# Patient Record
Sex: FEMALE | Race: WHITE | NOT HISPANIC OR LATINO | URBAN - METROPOLITAN AREA
[De-identification: names, ages, dates, MRNs, and addresses within clinical notes are randomized per-mention and may not be internally consistent; named-entity substitution may affect disease eponyms.]

---

## 2017-01-02 ENCOUNTER — HOSPITAL ENCOUNTER (OUTPATIENT)
Dept: MRI IMAGING | Facility: HOSPITAL | Age: 82
Discharge: HOME | End: 2017-01-02
Attending: PHYSICIAN ASSISTANT

## 2017-01-02 DIAGNOSIS — G47.9 SLEEP DISTURBANCE: ICD-10-CM

## 2017-01-02 DIAGNOSIS — M79.18 MYOFASCIAL PAIN: ICD-10-CM

## 2017-01-02 DIAGNOSIS — M54.50 LUMBALGIA: ICD-10-CM

## 2017-01-02 DIAGNOSIS — M53.3 SACROILIAC DYSFUNCTION: ICD-10-CM

## 2017-01-02 DIAGNOSIS — M54.16 LUMBAR RADICULITIS: ICD-10-CM

## 2017-01-05 ENCOUNTER — AMBULATORY - HEALTHEAST (OUTPATIENT)
Dept: NURSING | Facility: CLINIC | Age: 82
End: 2017-01-05

## 2017-01-05 ENCOUNTER — HOSPITAL ENCOUNTER (OUTPATIENT)
Dept: PHYSICAL MEDICINE AND REHAB | Facility: CLINIC | Age: 82
Discharge: HOME | End: 2017-01-05
Attending: PHYSICIAN ASSISTANT

## 2017-01-05 DIAGNOSIS — M51.26 LUMBAR DISC HERNIATION: ICD-10-CM

## 2017-01-05 DIAGNOSIS — G47.9 SLEEP DISTURBANCE: ICD-10-CM

## 2017-01-05 DIAGNOSIS — M79.18 MYOFASCIAL PAIN: ICD-10-CM

## 2017-01-05 DIAGNOSIS — M54.50 LUMBALGIA: ICD-10-CM

## 2017-01-05 DIAGNOSIS — M54.16 LUMBAR RADICULITIS: ICD-10-CM

## 2017-01-05 DIAGNOSIS — M53.3 SACROILIAC DYSFUNCTION: ICD-10-CM

## 2017-01-05 DIAGNOSIS — Z79.01 ON WARFARIN THERAPY: ICD-10-CM

## 2017-01-05 DIAGNOSIS — I48.20 CHRONIC ATRIAL FIBRILLATION (H): ICD-10-CM

## 2017-01-12 ENCOUNTER — COMMUNICATION - HEALTHEAST (OUTPATIENT)
Dept: PHYSICAL MEDICINE AND REHAB | Facility: CLINIC | Age: 82
End: 2017-01-12

## 2017-01-16 ENCOUNTER — AMBULATORY - HEALTHEAST (OUTPATIENT)
Dept: LAB | Facility: CLINIC | Age: 82
End: 2017-01-16

## 2017-01-16 ENCOUNTER — COMMUNICATION - HEALTHEAST (OUTPATIENT)
Dept: NURSING | Facility: CLINIC | Age: 82
End: 2017-01-16

## 2017-01-16 DIAGNOSIS — I48.91 ATRIAL FIBRILLATION (H): ICD-10-CM

## 2017-01-30 ENCOUNTER — HOSPITAL ENCOUNTER (OUTPATIENT)
Dept: PHYSICAL MEDICINE AND REHAB | Facility: CLINIC | Age: 82
Discharge: HOME | End: 2017-01-30
Attending: PHYSICIAN ASSISTANT

## 2017-01-30 DIAGNOSIS — M54.50 LOW BACK PAIN: ICD-10-CM

## 2017-01-30 DIAGNOSIS — M54.16 LUMBAR RADICULAR PAIN: ICD-10-CM

## 2017-01-30 DIAGNOSIS — Z79.01 ON WARFARIN THERAPY: ICD-10-CM

## 2017-02-07 ENCOUNTER — AMBULATORY - HEALTHEAST (OUTPATIENT)
Dept: LAB | Facility: CLINIC | Age: 82
End: 2017-02-07

## 2017-02-07 ENCOUNTER — COMMUNICATION - HEALTHEAST (OUTPATIENT)
Dept: FAMILY MEDICINE | Facility: CLINIC | Age: 82
End: 2017-02-07

## 2017-02-07 ENCOUNTER — COMMUNICATION - HEALTHEAST (OUTPATIENT)
Dept: INTERNAL MEDICINE | Facility: CLINIC | Age: 82
End: 2017-02-07

## 2017-02-07 ENCOUNTER — OFFICE VISIT - HEALTHEAST (OUTPATIENT)
Dept: PODIATRY | Age: 82
End: 2017-02-07

## 2017-02-07 DIAGNOSIS — I48.91 ATRIAL FIBRILLATION (H): ICD-10-CM

## 2017-02-07 DIAGNOSIS — I48.91 A-FIB (H): ICD-10-CM

## 2017-02-07 DIAGNOSIS — M79.673 PAIN OF FOOT, UNSPECIFIED LATERALITY: ICD-10-CM

## 2017-02-07 DIAGNOSIS — L60.2 ONYCHAUXIS: ICD-10-CM

## 2017-02-22 ENCOUNTER — COMMUNICATION - HEALTHEAST (OUTPATIENT)
Dept: FAMILY MEDICINE | Facility: CLINIC | Age: 82
End: 2017-02-22

## 2017-02-22 DIAGNOSIS — I48.91 A-FIB (H): ICD-10-CM

## 2017-03-02 ENCOUNTER — HOSPITAL ENCOUNTER (OUTPATIENT)
Dept: PHYSICAL MEDICINE AND REHAB | Facility: CLINIC | Age: 82
Discharge: HOME | End: 2017-03-02
Attending: PHYSICIAN ASSISTANT

## 2017-03-02 DIAGNOSIS — M54.16 LUMBAR RADICULAR PAIN: ICD-10-CM

## 2017-03-02 DIAGNOSIS — Z79.01 ON WARFARIN THERAPY: ICD-10-CM

## 2017-03-02 DIAGNOSIS — M79.18 MYOFASCIAL PAIN: ICD-10-CM

## 2017-03-02 DIAGNOSIS — M54.16 LUMBAR RADICULITIS: ICD-10-CM

## 2017-03-02 DIAGNOSIS — M54.50 LUMBALGIA: ICD-10-CM

## 2017-03-02 DIAGNOSIS — M51.26 LUMBAR DISC HERNIATION: ICD-10-CM

## 2017-03-02 DIAGNOSIS — M54.50 LOW BACK PAIN: ICD-10-CM

## 2017-03-02 ASSESSMENT — MIFFLIN-ST. JEOR: SCORE: 787.36

## 2017-03-03 ENCOUNTER — COMMUNICATION - HEALTHEAST (OUTPATIENT)
Dept: FAMILY MEDICINE | Facility: CLINIC | Age: 82
End: 2017-03-03

## 2017-03-03 DIAGNOSIS — I10 UNSPECIFIED ESSENTIAL HYPERTENSION: ICD-10-CM

## 2017-03-07 ENCOUNTER — COMMUNICATION - HEALTHEAST (OUTPATIENT)
Dept: NURSING | Facility: CLINIC | Age: 82
End: 2017-03-07

## 2017-03-07 ENCOUNTER — AMBULATORY - HEALTHEAST (OUTPATIENT)
Dept: LAB | Facility: CLINIC | Age: 82
End: 2017-03-07

## 2017-03-07 DIAGNOSIS — I48.91 ATRIAL FIBRILLATION (H): ICD-10-CM

## 2017-03-10 ENCOUNTER — OFFICE VISIT - HEALTHEAST (OUTPATIENT)
Dept: CARDIOLOGY | Facility: CLINIC | Age: 82
End: 2017-03-10

## 2017-03-10 DIAGNOSIS — I50.32 CHRONIC DIASTOLIC CONGESTIVE HEART FAILURE (H): ICD-10-CM

## 2017-03-10 ASSESSMENT — MIFFLIN-ST. JEOR: SCORE: 780.1

## 2017-03-13 ENCOUNTER — AMBULATORY - HEALTHEAST (OUTPATIENT)
Dept: CARDIOLOGY | Facility: CLINIC | Age: 82
End: 2017-03-13

## 2017-03-13 DIAGNOSIS — I50.32 CHRONIC DIASTOLIC CONGESTIVE HEART FAILURE (H): ICD-10-CM

## 2017-03-13 DIAGNOSIS — I48.91 ATRIAL FIBRILLATION (H): ICD-10-CM

## 2017-03-16 ENCOUNTER — HOSPITAL ENCOUNTER (OUTPATIENT)
Dept: CARDIOLOGY | Facility: HOSPITAL | Age: 82
Discharge: HOME | End: 2017-03-16
Attending: INTERNAL MEDICINE

## 2017-03-16 DIAGNOSIS — I48.91 ATRIAL FIBRILLATION (H): ICD-10-CM

## 2017-03-28 ENCOUNTER — AMBULATORY - HEALTHEAST (OUTPATIENT)
Dept: CARDIOLOGY | Facility: CLINIC | Age: 82
End: 2017-03-28

## 2017-03-28 DIAGNOSIS — I50.32 CHRONIC DIASTOLIC CONGESTIVE HEART FAILURE (H): ICD-10-CM

## 2017-04-06 ENCOUNTER — AMBULATORY - HEALTHEAST (OUTPATIENT)
Dept: CARDIOLOGY | Facility: CLINIC | Age: 82
End: 2017-04-06

## 2017-04-06 DIAGNOSIS — R79.89 HIGH SERUM CREATININE: ICD-10-CM

## 2017-04-13 ENCOUNTER — AMBULATORY - HEALTHEAST (OUTPATIENT)
Dept: CARDIOLOGY | Facility: CLINIC | Age: 82
End: 2017-04-13

## 2017-04-13 DIAGNOSIS — R79.89 HIGH SERUM CREATININE: ICD-10-CM

## 2017-04-18 ENCOUNTER — AMBULATORY - HEALTHEAST (OUTPATIENT)
Dept: LAB | Facility: CLINIC | Age: 82
End: 2017-04-18

## 2017-04-18 ENCOUNTER — COMMUNICATION - HEALTHEAST (OUTPATIENT)
Dept: INTERNAL MEDICINE | Facility: CLINIC | Age: 82
End: 2017-04-18

## 2017-04-18 DIAGNOSIS — I48.91 ATRIAL FIBRILLATION (H): ICD-10-CM

## 2017-04-18 DIAGNOSIS — I48.91 A-FIB (H): ICD-10-CM

## 2017-04-20 ENCOUNTER — OFFICE VISIT - HEALTHEAST (OUTPATIENT)
Dept: CARDIOLOGY | Facility: CLINIC | Age: 82
End: 2017-04-20

## 2017-04-20 DIAGNOSIS — I48.21 PERMANENT ATRIAL FIBRILLATION (H): ICD-10-CM

## 2017-04-20 ASSESSMENT — MIFFLIN-ST. JEOR: SCORE: 761.95

## 2017-04-28 ENCOUNTER — AMBULATORY - HEALTHEAST (OUTPATIENT)
Dept: LAB | Facility: CLINIC | Age: 82
End: 2017-04-28

## 2017-04-28 ENCOUNTER — COMMUNICATION - HEALTHEAST (OUTPATIENT)
Dept: NURSING | Facility: CLINIC | Age: 82
End: 2017-04-28

## 2017-04-28 DIAGNOSIS — I48.91 A-FIB (H): ICD-10-CM

## 2017-04-28 DIAGNOSIS — I48.91 ATRIAL FIBRILLATION (H): ICD-10-CM

## 2017-05-03 ENCOUNTER — COMMUNICATION - HEALTHEAST (OUTPATIENT)
Dept: CARDIOLOGY | Facility: CLINIC | Age: 82
End: 2017-05-03

## 2017-05-03 ENCOUNTER — COMMUNICATION - HEALTHEAST (OUTPATIENT)
Dept: FAMILY MEDICINE | Facility: CLINIC | Age: 82
End: 2017-05-03

## 2017-05-03 DIAGNOSIS — I48.91 A-FIB (H): ICD-10-CM

## 2017-05-09 ENCOUNTER — COMMUNICATION - HEALTHEAST (OUTPATIENT)
Dept: NURSING | Facility: CLINIC | Age: 82
End: 2017-05-09

## 2017-05-09 ENCOUNTER — AMBULATORY - HEALTHEAST (OUTPATIENT)
Dept: LAB | Facility: CLINIC | Age: 82
End: 2017-05-09

## 2017-05-09 DIAGNOSIS — I48.91 ATRIAL FIBRILLATION (H): ICD-10-CM

## 2017-05-13 ENCOUNTER — COMMUNICATION - HEALTHEAST (OUTPATIENT)
Dept: CARDIOLOGY | Facility: CLINIC | Age: 82
End: 2017-05-13

## 2017-05-13 DIAGNOSIS — I50.9 CONGESTIVE HEART FAILURE (H): ICD-10-CM

## 2017-05-17 ENCOUNTER — COMMUNICATION - HEALTHEAST (OUTPATIENT)
Dept: FAMILY MEDICINE | Facility: CLINIC | Age: 82
End: 2017-05-17

## 2017-05-17 DIAGNOSIS — I48.21 PERMANENT ATRIAL FIBRILLATION (H): ICD-10-CM

## 2017-05-18 ENCOUNTER — OFFICE VISIT - HEALTHEAST (OUTPATIENT)
Dept: INTERNAL MEDICINE | Facility: CLINIC | Age: 82
End: 2017-05-18

## 2017-05-18 ENCOUNTER — COMMUNICATION - HEALTHEAST (OUTPATIENT)
Dept: FAMILY MEDICINE | Facility: CLINIC | Age: 82
End: 2017-05-18

## 2017-05-18 DIAGNOSIS — M81.0 OSTEOPOROSIS: ICD-10-CM

## 2017-05-18 DIAGNOSIS — I48.91 ATRIAL FIBRILLATION (H): ICD-10-CM

## 2017-05-18 DIAGNOSIS — I48.91 A-FIB (H): ICD-10-CM

## 2017-05-22 ENCOUNTER — HOSPITAL ENCOUNTER (OUTPATIENT)
Dept: CARDIOLOGY | Facility: HOSPITAL | Age: 82
Discharge: HOME | End: 2017-05-22
Attending: INTERNAL MEDICINE

## 2017-05-22 DIAGNOSIS — I48.21 PERMANENT ATRIAL FIBRILLATION (H): ICD-10-CM

## 2017-05-30 ENCOUNTER — AMBULATORY - HEALTHEAST (OUTPATIENT)
Dept: CARDIOLOGY | Facility: CLINIC | Age: 82
End: 2017-05-30

## 2017-05-30 DIAGNOSIS — I48.91 A-FIB (H): ICD-10-CM

## 2017-05-30 DIAGNOSIS — I48.21 PERMANENT ATRIAL FIBRILLATION (H): ICD-10-CM

## 2017-05-31 ENCOUNTER — COMMUNICATION - HEALTHEAST (OUTPATIENT)
Dept: CARDIOLOGY | Facility: CLINIC | Age: 82
End: 2017-05-31

## 2017-05-31 DIAGNOSIS — I50.9 CONGESTIVE HEART FAILURE (H): ICD-10-CM

## 2017-06-05 ENCOUNTER — COMMUNICATION - HEALTHEAST (OUTPATIENT)
Dept: NURSING | Facility: CLINIC | Age: 82
End: 2017-06-05

## 2017-06-05 ENCOUNTER — AMBULATORY - HEALTHEAST (OUTPATIENT)
Dept: LAB | Facility: CLINIC | Age: 82
End: 2017-06-05

## 2017-06-05 DIAGNOSIS — I48.91 ATRIAL FIBRILLATION (H): ICD-10-CM

## 2017-06-05 DIAGNOSIS — I48.91 A-FIB (H): ICD-10-CM

## 2017-06-19 ENCOUNTER — AMBULATORY - HEALTHEAST (OUTPATIENT)
Dept: LAB | Facility: CLINIC | Age: 82
End: 2017-06-19

## 2017-06-19 ENCOUNTER — COMMUNICATION - HEALTHEAST (OUTPATIENT)
Dept: NURSING | Facility: CLINIC | Age: 82
End: 2017-06-19

## 2017-06-19 DIAGNOSIS — I48.91 ATRIAL FIBRILLATION (H): ICD-10-CM

## 2017-07-18 ENCOUNTER — COMMUNICATION - HEALTHEAST (OUTPATIENT)
Dept: NURSING | Facility: CLINIC | Age: 82
End: 2017-07-18

## 2017-07-18 ENCOUNTER — COMMUNICATION - HEALTHEAST (OUTPATIENT)
Dept: SCHEDULING | Facility: CLINIC | Age: 82
End: 2017-07-18

## 2017-07-18 ENCOUNTER — AMBULATORY - HEALTHEAST (OUTPATIENT)
Dept: LAB | Facility: CLINIC | Age: 82
End: 2017-07-18

## 2017-07-18 ENCOUNTER — AMBULATORY - HEALTHEAST (OUTPATIENT)
Dept: PODIATRY | Age: 82
End: 2017-07-18

## 2017-07-18 DIAGNOSIS — L60.2 ONYCHAUXIS: ICD-10-CM

## 2017-07-18 DIAGNOSIS — I48.91 A-FIB (H): ICD-10-CM

## 2017-07-18 DIAGNOSIS — I48.91 ATRIAL FIBRILLATION (H): ICD-10-CM

## 2017-07-18 DIAGNOSIS — M79.673 PAIN OF FOOT, UNSPECIFIED LATERALITY: ICD-10-CM

## 2017-07-18 ASSESSMENT — MIFFLIN-ST. JEOR: SCORE: 752.88

## 2017-08-01 ENCOUNTER — COMMUNICATION - HEALTHEAST (OUTPATIENT)
Dept: NURSING | Facility: CLINIC | Age: 82
End: 2017-08-01

## 2017-08-01 DIAGNOSIS — I48.91 A-FIB (H): ICD-10-CM

## 2017-08-24 ENCOUNTER — COMMUNICATION - HEALTHEAST (OUTPATIENT)
Dept: FAMILY MEDICINE | Facility: CLINIC | Age: 82
End: 2017-08-24

## 2017-08-24 DIAGNOSIS — I48.91 A-FIB (H): ICD-10-CM

## 2017-08-28 ENCOUNTER — HOSPITAL ENCOUNTER (OUTPATIENT)
Dept: CARDIOLOGY | Facility: HOSPITAL | Age: 82
Discharge: HOME | End: 2017-08-28
Attending: INTERNAL MEDICINE

## 2017-08-28 DIAGNOSIS — I48.91 A-FIB (H): ICD-10-CM

## 2017-08-29 ENCOUNTER — COMMUNICATION - HEALTHEAST (OUTPATIENT)
Dept: NURSING | Facility: CLINIC | Age: 82
End: 2017-08-29

## 2017-08-29 ENCOUNTER — AMBULATORY - HEALTHEAST (OUTPATIENT)
Dept: LAB | Facility: CLINIC | Age: 82
End: 2017-08-29

## 2017-08-29 DIAGNOSIS — I48.91 A-FIB (H): ICD-10-CM

## 2017-09-12 ENCOUNTER — OFFICE VISIT - HEALTHEAST (OUTPATIENT)
Dept: FAMILY MEDICINE | Facility: CLINIC | Age: 82
End: 2017-09-12

## 2017-09-12 DIAGNOSIS — R23.8 DRY SCALP: ICD-10-CM

## 2017-10-02 ENCOUNTER — AMBULATORY - HEALTHEAST (OUTPATIENT)
Dept: LAB | Facility: CLINIC | Age: 82
End: 2017-10-02

## 2017-10-02 ENCOUNTER — AMBULATORY - HEALTHEAST (OUTPATIENT)
Dept: NURSING | Facility: CLINIC | Age: 82
End: 2017-10-02

## 2017-10-02 ENCOUNTER — COMMUNICATION - HEALTHEAST (OUTPATIENT)
Dept: NURSING | Facility: CLINIC | Age: 82
End: 2017-10-02

## 2017-10-02 DIAGNOSIS — Z23 FLU VACCINE NEED: ICD-10-CM

## 2017-10-02 DIAGNOSIS — I48.91 A-FIB (H): ICD-10-CM

## 2017-10-31 ENCOUNTER — AMBULATORY - HEALTHEAST (OUTPATIENT)
Dept: PODIATRY | Age: 82
End: 2017-10-31

## 2017-10-31 DIAGNOSIS — L60.2 ONYCHAUXIS: ICD-10-CM

## 2017-10-31 DIAGNOSIS — M79.673 PAIN OF FOOT, UNSPECIFIED LATERALITY: ICD-10-CM

## 2017-10-31 ASSESSMENT — MIFFLIN-ST. JEOR: SCORE: 757.42

## 2017-11-06 ENCOUNTER — COMMUNICATION - HEALTHEAST (OUTPATIENT)
Dept: NURSING | Facility: CLINIC | Age: 82
End: 2017-11-06

## 2017-11-06 ENCOUNTER — AMBULATORY - HEALTHEAST (OUTPATIENT)
Dept: LAB | Facility: CLINIC | Age: 82
End: 2017-11-06

## 2017-11-06 DIAGNOSIS — I48.91 A-FIB (H): ICD-10-CM

## 2017-11-20 ENCOUNTER — AMBULATORY - HEALTHEAST (OUTPATIENT)
Dept: NURSING | Facility: CLINIC | Age: 82
End: 2017-11-20

## 2017-11-20 DIAGNOSIS — M81.0 OSTEOPOROSIS: ICD-10-CM

## 2017-12-01 ENCOUNTER — OFFICE VISIT - HEALTHEAST (OUTPATIENT)
Dept: CARDIOLOGY | Facility: CLINIC | Age: 82
End: 2017-12-01

## 2017-12-01 DIAGNOSIS — I48.21 PERMANENT ATRIAL FIBRILLATION (H): ICD-10-CM

## 2017-12-01 ASSESSMENT — MIFFLIN-ST. JEOR: SCORE: 761.95

## 2017-12-08 ENCOUNTER — COMMUNICATION - HEALTHEAST (OUTPATIENT)
Dept: FAMILY MEDICINE | Facility: CLINIC | Age: 82
End: 2017-12-08

## 2017-12-08 DIAGNOSIS — I48.21 PERMANENT ATRIAL FIBRILLATION (H): ICD-10-CM

## 2017-12-11 ENCOUNTER — AMBULATORY - HEALTHEAST (OUTPATIENT)
Dept: LAB | Facility: CLINIC | Age: 82
End: 2017-12-11

## 2017-12-11 ENCOUNTER — COMMUNICATION - HEALTHEAST (OUTPATIENT)
Dept: NURSING | Facility: CLINIC | Age: 82
End: 2017-12-11

## 2017-12-11 DIAGNOSIS — I48.91 A-FIB (H): ICD-10-CM

## 2017-12-18 ENCOUNTER — COMMUNICATION - HEALTHEAST (OUTPATIENT)
Dept: FAMILY MEDICINE | Facility: CLINIC | Age: 82
End: 2017-12-18

## 2017-12-18 DIAGNOSIS — I10 ESSENTIAL HYPERTENSION: ICD-10-CM

## 2018-01-16 ENCOUNTER — COMMUNICATION - HEALTHEAST (OUTPATIENT)
Dept: NURSING | Facility: CLINIC | Age: 83
End: 2018-01-16

## 2018-01-16 ENCOUNTER — AMBULATORY - HEALTHEAST (OUTPATIENT)
Dept: PODIATRY | Age: 83
End: 2018-01-16

## 2018-01-16 ENCOUNTER — COMMUNICATION - HEALTHEAST (OUTPATIENT)
Dept: FAMILY MEDICINE | Facility: CLINIC | Age: 83
End: 2018-01-16

## 2018-01-16 ENCOUNTER — AMBULATORY - HEALTHEAST (OUTPATIENT)
Dept: LAB | Facility: CLINIC | Age: 83
End: 2018-01-16

## 2018-01-16 DIAGNOSIS — L60.2 ONYCHAUXIS: ICD-10-CM

## 2018-01-16 DIAGNOSIS — M79.673 PAIN OF FOOT, UNSPECIFIED LATERALITY: ICD-10-CM

## 2018-01-16 DIAGNOSIS — I48.91 A-FIB (H): ICD-10-CM

## 2018-01-16 LAB — INR PPP: 2.4 (ref 0.9–1.1)

## 2018-01-16 ASSESSMENT — MIFFLIN-ST. JEOR: SCORE: 761.95

## 2018-02-17 ENCOUNTER — COMMUNICATION - HEALTHEAST (OUTPATIENT)
Dept: CARDIOLOGY | Facility: CLINIC | Age: 83
End: 2018-02-17

## 2018-02-17 DIAGNOSIS — I50.9 CONGESTIVE HEART FAILURE (H): ICD-10-CM

## 2018-02-19 ENCOUNTER — COMMUNICATION - HEALTHEAST (OUTPATIENT)
Dept: NURSING | Facility: CLINIC | Age: 83
End: 2018-02-19

## 2018-02-19 DIAGNOSIS — I48.91 A-FIB (H): ICD-10-CM

## 2018-02-22 ENCOUNTER — COMMUNICATION - HEALTHEAST (OUTPATIENT)
Dept: CARDIOLOGY | Facility: CLINIC | Age: 83
End: 2018-02-22

## 2018-02-22 ENCOUNTER — COMMUNICATION - HEALTHEAST (OUTPATIENT)
Dept: NURSING | Facility: CLINIC | Age: 83
End: 2018-02-22

## 2018-02-22 ENCOUNTER — AMBULATORY - HEALTHEAST (OUTPATIENT)
Dept: LAB | Facility: CLINIC | Age: 83
End: 2018-02-22

## 2018-02-22 ENCOUNTER — COMMUNICATION - HEALTHEAST (OUTPATIENT)
Dept: FAMILY MEDICINE | Facility: CLINIC | Age: 83
End: 2018-02-22

## 2018-02-22 DIAGNOSIS — I48.91 A-FIB (H): ICD-10-CM

## 2018-02-22 DIAGNOSIS — I50.9 CONGESTIVE HEART FAILURE (H): ICD-10-CM

## 2018-02-22 LAB — INR PPP: 1.8 (ref 0.9–1.1)

## 2018-02-28 ENCOUNTER — OFFICE VISIT - HEALTHEAST (OUTPATIENT)
Dept: FAMILY MEDICINE | Facility: CLINIC | Age: 83
End: 2018-02-28

## 2018-02-28 ENCOUNTER — COMMUNICATION - HEALTHEAST (OUTPATIENT)
Dept: NURSING | Facility: CLINIC | Age: 83
End: 2018-02-28

## 2018-02-28 DIAGNOSIS — Z00.00 ROUTINE GENERAL MEDICAL EXAMINATION AT A HEALTH CARE FACILITY: ICD-10-CM

## 2018-02-28 DIAGNOSIS — Z87.440 H/O CYSTITIS: ICD-10-CM

## 2018-02-28 DIAGNOSIS — N18.4 CHRONIC KIDNEY DISEASE, STAGE IV (SEVERE) (H): ICD-10-CM

## 2018-02-28 DIAGNOSIS — G25.81 RESTLESS LEGS SYNDROME (RLS): ICD-10-CM

## 2018-02-28 DIAGNOSIS — I48.21 PERMANENT ATRIAL FIBRILLATION (H): ICD-10-CM

## 2018-02-28 DIAGNOSIS — I48.91 A-FIB (H): ICD-10-CM

## 2018-02-28 DIAGNOSIS — N18.4 STAGE 4 CHRONIC KIDNEY DISEASE (H): ICD-10-CM

## 2018-02-28 DIAGNOSIS — Z23 NEED FOR PNEUMOCOCCAL VACCINATION: ICD-10-CM

## 2018-02-28 DIAGNOSIS — I10 ESSENTIAL HYPERTENSION: ICD-10-CM

## 2018-02-28 LAB
ALBUMIN UR-MCNC: NEGATIVE MG/DL
APPEARANCE UR: CLEAR
BACTERIA #/AREA URNS HPF: ABNORMAL HPF
BILIRUB UR QL STRIP: NEGATIVE
COLOR UR AUTO: YELLOW
FERRITIN SERPL-MCNC: 76 NG/ML (ref 10–130)
GLUCOSE UR STRIP-MCNC: NEGATIVE MG/DL
HGB UR QL STRIP: NEGATIVE
HYALINE CASTS #/AREA URNS LPF: ABNORMAL LPF
INR PPP: 1.9 (ref 0.9–1.1)
IRON SATN MFR SERPL: 29 % (ref 20–50)
IRON SERPL-MCNC: 97 UG/DL (ref 42–175)
KETONES UR STRIP-MCNC: NEGATIVE MG/DL
LEUKOCYTE ESTERASE UR QL STRIP: ABNORMAL
NITRATE UR QL: NEGATIVE
PH UR STRIP: 5.5 [PH] (ref 5–8)
RBC #/AREA URNS AUTO: ABNORMAL HPF
SP GR UR STRIP: 1.01 (ref 1–1.03)
SQUAMOUS #/AREA URNS AUTO: ABNORMAL LPF
TIBC SERPL-MCNC: 330 UG/DL (ref 313–563)
TRANSFERRIN SERPL-MCNC: 264 MG/DL (ref 212–360)
UROBILINOGEN UR STRIP-ACNC: ABNORMAL
WBC #/AREA URNS AUTO: ABNORMAL HPF

## 2018-02-28 ASSESSMENT — MIFFLIN-ST. JEOR: SCORE: 750.62

## 2018-03-01 LAB — BACTERIA SPEC CULT: NO GROWTH

## 2018-03-02 ENCOUNTER — COMMUNICATION - HEALTHEAST (OUTPATIENT)
Dept: FAMILY MEDICINE | Facility: CLINIC | Age: 83
End: 2018-03-02

## 2018-03-13 ENCOUNTER — COMMUNICATION - HEALTHEAST (OUTPATIENT)
Dept: NURSING | Facility: CLINIC | Age: 83
End: 2018-03-13

## 2018-03-13 ENCOUNTER — AMBULATORY - HEALTHEAST (OUTPATIENT)
Dept: LAB | Facility: CLINIC | Age: 83
End: 2018-03-13

## 2018-03-13 DIAGNOSIS — I48.91 A-FIB (H): ICD-10-CM

## 2018-03-13 LAB — INR PPP: 1.7 (ref 0.9–1.1)

## 2018-03-27 ENCOUNTER — AMBULATORY - HEALTHEAST (OUTPATIENT)
Dept: PODIATRY | Age: 83
End: 2018-03-27

## 2018-03-27 ENCOUNTER — COMMUNICATION - HEALTHEAST (OUTPATIENT)
Dept: NURSING | Facility: CLINIC | Age: 83
End: 2018-03-27

## 2018-03-27 ENCOUNTER — AMBULATORY - HEALTHEAST (OUTPATIENT)
Dept: LAB | Facility: CLINIC | Age: 83
End: 2018-03-27

## 2018-03-27 DIAGNOSIS — M79.673 PAIN OF FOOT, UNSPECIFIED LATERALITY: ICD-10-CM

## 2018-03-27 DIAGNOSIS — I48.91 A-FIB (H): ICD-10-CM

## 2018-03-27 DIAGNOSIS — L60.2 ONYCHAUXIS: ICD-10-CM

## 2018-03-27 LAB — INR PPP: 1.6 (ref 0.9–1.1)

## 2018-03-27 ASSESSMENT — MIFFLIN-ST. JEOR: SCORE: 750.62

## 2018-04-04 ENCOUNTER — COMMUNICATION - HEALTHEAST (OUTPATIENT)
Dept: ADMINISTRATIVE | Facility: CLINIC | Age: 83
End: 2018-04-04

## 2018-04-10 ENCOUNTER — AMBULATORY - HEALTHEAST (OUTPATIENT)
Dept: LAB | Facility: CLINIC | Age: 83
End: 2018-04-10

## 2018-04-10 ENCOUNTER — COMMUNICATION - HEALTHEAST (OUTPATIENT)
Dept: ANTICOAGULATION | Facility: CLINIC | Age: 83
End: 2018-04-10

## 2018-04-10 DIAGNOSIS — I48.91 A-FIB (H): ICD-10-CM

## 2018-04-10 LAB — INR PPP: 1.7 (ref 0.9–1.1)

## 2018-04-20 ENCOUNTER — COMMUNICATION - HEALTHEAST (OUTPATIENT)
Dept: ANTICOAGULATION | Facility: CLINIC | Age: 83
End: 2018-04-20

## 2018-04-20 ENCOUNTER — AMBULATORY - HEALTHEAST (OUTPATIENT)
Dept: LAB | Facility: CLINIC | Age: 83
End: 2018-04-20

## 2018-04-20 DIAGNOSIS — I48.91 A-FIB (H): ICD-10-CM

## 2018-04-20 LAB — INR PPP: 3 (ref 0.9–1.1)

## 2018-05-04 ENCOUNTER — COMMUNICATION - HEALTHEAST (OUTPATIENT)
Dept: ANTICOAGULATION | Facility: CLINIC | Age: 83
End: 2018-05-04

## 2018-05-04 ENCOUNTER — AMBULATORY - HEALTHEAST (OUTPATIENT)
Dept: LAB | Facility: CLINIC | Age: 83
End: 2018-05-04

## 2018-05-04 DIAGNOSIS — I48.91 A-FIB (H): ICD-10-CM

## 2018-05-04 LAB — INR PPP: 3.1 (ref 0.9–1.1)

## 2018-05-21 ENCOUNTER — COMMUNICATION - HEALTHEAST (OUTPATIENT)
Dept: ANTICOAGULATION | Facility: CLINIC | Age: 83
End: 2018-05-21

## 2018-05-21 ENCOUNTER — COMMUNICATION - HEALTHEAST (OUTPATIENT)
Dept: FAMILY MEDICINE | Facility: CLINIC | Age: 83
End: 2018-05-21

## 2018-05-21 ENCOUNTER — AMBULATORY - HEALTHEAST (OUTPATIENT)
Dept: LAB | Facility: CLINIC | Age: 83
End: 2018-05-21

## 2018-05-21 DIAGNOSIS — I48.91 A-FIB (H): ICD-10-CM

## 2018-05-21 DIAGNOSIS — I48.21 PERMANENT ATRIAL FIBRILLATION (H): ICD-10-CM

## 2018-05-21 LAB — INR PPP: 3.7 (ref 0.9–1.1)

## 2018-06-07 ENCOUNTER — RECORDS - HEALTHEAST (OUTPATIENT)
Dept: BONE DENSITY | Facility: CLINIC | Age: 83
End: 2018-06-07

## 2018-06-07 ENCOUNTER — RECORDS - HEALTHEAST (OUTPATIENT)
Dept: ADMINISTRATIVE | Facility: OTHER | Age: 83
End: 2018-06-07

## 2018-06-07 ENCOUNTER — OFFICE VISIT - HEALTHEAST (OUTPATIENT)
Dept: INTERNAL MEDICINE | Facility: CLINIC | Age: 83
End: 2018-06-07

## 2018-06-07 ENCOUNTER — COMMUNICATION - HEALTHEAST (OUTPATIENT)
Dept: ANTICOAGULATION | Facility: CLINIC | Age: 83
End: 2018-06-07

## 2018-06-07 DIAGNOSIS — M81.0 AGE-RELATED OSTEOPOROSIS WITHOUT CURRENT PATHOLOGICAL FRACTURE: ICD-10-CM

## 2018-06-07 DIAGNOSIS — I48.91 A-FIB (H): ICD-10-CM

## 2018-06-07 DIAGNOSIS — M81.0 OSTEOPOROSIS: ICD-10-CM

## 2018-06-07 LAB — INR PPP: 3.1 (ref 0.9–1.1)

## 2018-06-07 ASSESSMENT — MIFFLIN-ST. JEOR: SCORE: 755.72

## 2018-06-15 ENCOUNTER — RECORDS - HEALTHEAST (OUTPATIENT)
Dept: ADMINISTRATIVE | Facility: OTHER | Age: 83
End: 2018-06-15

## 2018-06-18 ENCOUNTER — OFFICE VISIT - HEALTHEAST (OUTPATIENT)
Dept: CARDIOLOGY | Facility: CLINIC | Age: 83
End: 2018-06-18

## 2018-06-18 ENCOUNTER — COMMUNICATION - HEALTHEAST (OUTPATIENT)
Dept: ANTICOAGULATION | Facility: CLINIC | Age: 83
End: 2018-06-18

## 2018-06-18 DIAGNOSIS — I48.21 PERMANENT ATRIAL FIBRILLATION (H): ICD-10-CM

## 2018-06-18 LAB — POC INR - HE - HISTORICAL: 2.7 (ref 0.9–1.1)

## 2018-06-18 ASSESSMENT — MIFFLIN-ST. JEOR: SCORE: 746.08

## 2018-06-26 ENCOUNTER — COMMUNICATION - HEALTHEAST (OUTPATIENT)
Dept: FAMILY MEDICINE | Facility: CLINIC | Age: 83
End: 2018-06-26

## 2018-07-02 ENCOUNTER — COMMUNICATION - HEALTHEAST (OUTPATIENT)
Dept: ANTICOAGULATION | Facility: CLINIC | Age: 83
End: 2018-07-02

## 2018-07-02 DIAGNOSIS — I48.21 PERMANENT ATRIAL FIBRILLATION (H): ICD-10-CM

## 2018-07-10 ENCOUNTER — AMBULATORY - HEALTHEAST (OUTPATIENT)
Dept: LAB | Facility: CLINIC | Age: 83
End: 2018-07-10

## 2018-07-10 ENCOUNTER — COMMUNICATION - HEALTHEAST (OUTPATIENT)
Dept: ANTICOAGULATION | Facility: CLINIC | Age: 83
End: 2018-07-10

## 2018-07-10 DIAGNOSIS — I48.21 PERMANENT ATRIAL FIBRILLATION (H): ICD-10-CM

## 2018-07-10 LAB — INR PPP: 2.6 (ref 0.9–1.1)

## 2018-07-12 ENCOUNTER — AMBULATORY - HEALTHEAST (OUTPATIENT)
Dept: NURSING | Facility: CLINIC | Age: 83
End: 2018-07-12

## 2018-07-27 ENCOUNTER — COMMUNICATION - HEALTHEAST (OUTPATIENT)
Dept: FAMILY MEDICINE | Facility: CLINIC | Age: 83
End: 2018-07-27

## 2018-07-27 ENCOUNTER — COMMUNICATION - HEALTHEAST (OUTPATIENT)
Dept: CARDIOLOGY | Facility: CLINIC | Age: 83
End: 2018-07-27

## 2018-07-27 DIAGNOSIS — I10 ESSENTIAL HYPERTENSION: ICD-10-CM

## 2018-07-27 DIAGNOSIS — I50.9 CONGESTIVE HEART FAILURE (H): ICD-10-CM

## 2018-08-06 ENCOUNTER — AMBULATORY - HEALTHEAST (OUTPATIENT)
Dept: LAB | Facility: CLINIC | Age: 83
End: 2018-08-06

## 2018-08-06 ENCOUNTER — COMMUNICATION - HEALTHEAST (OUTPATIENT)
Dept: ANTICOAGULATION | Facility: CLINIC | Age: 83
End: 2018-08-06

## 2018-08-06 DIAGNOSIS — I48.21 PERMANENT ATRIAL FIBRILLATION (H): ICD-10-CM

## 2018-08-06 LAB — INR PPP: 3.7 (ref 0.9–1.1)

## 2018-08-20 ENCOUNTER — COMMUNICATION - HEALTHEAST (OUTPATIENT)
Dept: FAMILY MEDICINE | Facility: CLINIC | Age: 83
End: 2018-08-20

## 2018-08-20 ENCOUNTER — AMBULATORY - HEALTHEAST (OUTPATIENT)
Dept: LAB | Facility: CLINIC | Age: 83
End: 2018-08-20

## 2018-08-20 ENCOUNTER — COMMUNICATION - HEALTHEAST (OUTPATIENT)
Dept: ANTICOAGULATION | Facility: CLINIC | Age: 83
End: 2018-08-20

## 2018-08-20 DIAGNOSIS — I48.21 PERMANENT ATRIAL FIBRILLATION (H): ICD-10-CM

## 2018-08-20 LAB — INR PPP: 2.5 (ref 0.9–1.1)

## 2018-09-04 ENCOUNTER — AMBULATORY - HEALTHEAST (OUTPATIENT)
Dept: LAB | Facility: CLINIC | Age: 83
End: 2018-09-04

## 2018-09-04 ENCOUNTER — COMMUNICATION - HEALTHEAST (OUTPATIENT)
Dept: ANTICOAGULATION | Facility: CLINIC | Age: 83
End: 2018-09-04

## 2018-09-04 DIAGNOSIS — I48.21 PERMANENT ATRIAL FIBRILLATION (H): ICD-10-CM

## 2018-09-04 LAB — INR PPP: 2.9 (ref 0.9–1.1)

## 2018-09-22 ENCOUNTER — COMMUNICATION - HEALTHEAST (OUTPATIENT)
Dept: FAMILY MEDICINE | Facility: CLINIC | Age: 83
End: 2018-09-22

## 2018-09-22 DIAGNOSIS — I48.91 A-FIB (H): ICD-10-CM

## 2018-10-09 ENCOUNTER — AMBULATORY - HEALTHEAST (OUTPATIENT)
Dept: LAB | Facility: CLINIC | Age: 83
End: 2018-10-09

## 2018-10-09 ENCOUNTER — COMMUNICATION - HEALTHEAST (OUTPATIENT)
Dept: ANTICOAGULATION | Facility: CLINIC | Age: 83
End: 2018-10-09

## 2018-10-09 ENCOUNTER — AMBULATORY - HEALTHEAST (OUTPATIENT)
Dept: NURSING | Facility: CLINIC | Age: 83
End: 2018-10-09

## 2018-10-09 DIAGNOSIS — Z23 FLU VACCINE NEED: ICD-10-CM

## 2018-10-09 DIAGNOSIS — I48.21 PERMANENT ATRIAL FIBRILLATION (H): ICD-10-CM

## 2018-10-09 LAB — INR PPP: 2.9 (ref 0.9–1.1)

## 2018-10-24 ENCOUNTER — COMMUNICATION - HEALTHEAST (OUTPATIENT)
Dept: ADMINISTRATIVE | Facility: CLINIC | Age: 83
End: 2018-10-24

## 2018-11-08 ENCOUNTER — COMMUNICATION - HEALTHEAST (OUTPATIENT)
Dept: CARDIOLOGY | Facility: CLINIC | Age: 83
End: 2018-11-08

## 2018-11-08 DIAGNOSIS — I50.9 CONGESTIVE HEART FAILURE (H): ICD-10-CM

## 2018-11-20 ENCOUNTER — AMBULATORY - HEALTHEAST (OUTPATIENT)
Dept: LAB | Facility: CLINIC | Age: 83
End: 2018-11-20

## 2018-11-20 ENCOUNTER — COMMUNICATION - HEALTHEAST (OUTPATIENT)
Dept: ANTICOAGULATION | Facility: CLINIC | Age: 83
End: 2018-11-20

## 2018-11-20 DIAGNOSIS — I48.21 PERMANENT ATRIAL FIBRILLATION (H): ICD-10-CM

## 2018-11-20 LAB — INR PPP: 2.6 (ref 0.9–1.1)

## 2018-12-10 ENCOUNTER — AMBULATORY - HEALTHEAST (OUTPATIENT)
Dept: NURSING | Facility: CLINIC | Age: 83
End: 2018-12-10

## 2019-01-01 ENCOUNTER — AMBULATORY - HEALTHEAST (OUTPATIENT)
Dept: LAB | Facility: CLINIC | Age: 84
End: 2019-01-01

## 2019-01-01 ENCOUNTER — OFFICE VISIT - HEALTHEAST (OUTPATIENT)
Dept: AUDIOLOGY | Facility: CLINIC | Age: 84
End: 2019-01-01

## 2019-01-01 ENCOUNTER — COMMUNICATION - HEALTHEAST (OUTPATIENT)
Dept: ANTICOAGULATION | Facility: CLINIC | Age: 84
End: 2019-01-01

## 2019-01-01 ENCOUNTER — AMBULATORY - HEALTHEAST (OUTPATIENT)
Dept: CARDIOLOGY | Facility: CLINIC | Age: 84
End: 2019-01-01

## 2019-01-01 ENCOUNTER — COMMUNICATION - HEALTHEAST (OUTPATIENT)
Dept: CARDIOLOGY | Facility: CLINIC | Age: 84
End: 2019-01-01

## 2019-01-01 ENCOUNTER — OFFICE VISIT - HEALTHEAST (OUTPATIENT)
Dept: FAMILY MEDICINE | Facility: CLINIC | Age: 84
End: 2019-01-01

## 2019-01-01 ENCOUNTER — RECORDS - HEALTHEAST (OUTPATIENT)
Dept: ADMINISTRATIVE | Facility: OTHER | Age: 84
End: 2019-01-01

## 2019-01-01 ENCOUNTER — OFFICE VISIT - HEALTHEAST (OUTPATIENT)
Dept: CARDIOLOGY | Facility: CLINIC | Age: 84
End: 2019-01-01

## 2019-01-01 ENCOUNTER — AMBULATORY - HEALTHEAST (OUTPATIENT)
Dept: NURSING | Facility: CLINIC | Age: 84
End: 2019-01-01

## 2019-01-01 ENCOUNTER — OFFICE VISIT - HEALTHEAST (OUTPATIENT)
Dept: INTERNAL MEDICINE | Facility: CLINIC | Age: 84
End: 2019-01-01

## 2019-01-01 ENCOUNTER — COMMUNICATION - HEALTHEAST (OUTPATIENT)
Dept: LAB | Facility: CLINIC | Age: 84
End: 2019-01-01

## 2019-01-01 ENCOUNTER — COMMUNICATION - HEALTHEAST (OUTPATIENT)
Dept: SURGERY | Facility: CLINIC | Age: 84
End: 2019-01-01

## 2019-01-01 ENCOUNTER — COMMUNICATION - HEALTHEAST (OUTPATIENT)
Dept: INTERNAL MEDICINE | Facility: CLINIC | Age: 84
End: 2019-01-01

## 2019-01-01 ENCOUNTER — COMMUNICATION - HEALTHEAST (OUTPATIENT)
Dept: FAMILY MEDICINE | Facility: CLINIC | Age: 84
End: 2019-01-01

## 2019-01-01 ENCOUNTER — OFFICE VISIT - HEALTHEAST (OUTPATIENT)
Dept: PHYSICAL THERAPY | Facility: CLINIC | Age: 84
End: 2019-01-01

## 2019-01-01 ENCOUNTER — COMMUNICATION - HEALTHEAST (OUTPATIENT)
Dept: ADMINISTRATIVE | Facility: CLINIC | Age: 84
End: 2019-01-01

## 2019-01-01 ENCOUNTER — HOSPITAL ENCOUNTER (OUTPATIENT)
Dept: PHYSICAL MEDICINE AND REHAB | Facility: CLINIC | Age: 84
Discharge: HOME | End: 2019-05-23
Attending: PHYSICAL MEDICINE & REHABILITATION

## 2019-01-01 ENCOUNTER — RECORDS - HEALTHEAST (OUTPATIENT)
Dept: GENERAL RADIOLOGY | Facility: CLINIC | Age: 84
End: 2019-01-01

## 2019-01-01 ENCOUNTER — AMBULATORY - HEALTHEAST (OUTPATIENT)
Dept: INTERNAL MEDICINE | Facility: CLINIC | Age: 84
End: 2019-01-01

## 2019-01-01 DIAGNOSIS — M25.552 PAIN IN LEFT HIP: ICD-10-CM

## 2019-01-01 DIAGNOSIS — H93.13 TINNITUS OF BOTH EARS: ICD-10-CM

## 2019-01-01 DIAGNOSIS — M70.61 GREATER TROCHANTERIC BURSITIS OF BOTH HIPS: ICD-10-CM

## 2019-01-01 DIAGNOSIS — M70.62 GREATER TROCHANTERIC BURSITIS OF BOTH HIPS: ICD-10-CM

## 2019-01-01 DIAGNOSIS — M54.50 CHRONIC BILATERAL LOW BACK PAIN WITHOUT SCIATICA: ICD-10-CM

## 2019-01-01 DIAGNOSIS — M25.552 HIP PAIN, LEFT: ICD-10-CM

## 2019-01-01 DIAGNOSIS — I48.21 PERMANENT ATRIAL FIBRILLATION (H): ICD-10-CM

## 2019-01-01 DIAGNOSIS — I48.91 A-FIB (H): ICD-10-CM

## 2019-01-01 DIAGNOSIS — M43.16 SPONDYLOLISTHESIS OF LUMBAR REGION: ICD-10-CM

## 2019-01-01 DIAGNOSIS — R19.7 DIARRHEA, UNSPECIFIED TYPE: ICD-10-CM

## 2019-01-01 DIAGNOSIS — M41.20 OTHER IDIOPATHIC SCOLIOSIS, UNSPECIFIED SPINAL REGION: ICD-10-CM

## 2019-01-01 DIAGNOSIS — I50.9 CONGESTIVE HEART FAILURE (H): ICD-10-CM

## 2019-01-01 DIAGNOSIS — H90.3 SENSORINEURAL HEARING LOSS, BILATERAL: ICD-10-CM

## 2019-01-01 DIAGNOSIS — H91.90 PERCEIVED HEARING CHANGES: ICD-10-CM

## 2019-01-01 DIAGNOSIS — Z79.01 LONG TERM (CURRENT) USE OF ANTICOAGULANTS: ICD-10-CM

## 2019-01-01 DIAGNOSIS — M25.511 RIGHT SHOULDER PAIN, UNSPECIFIED CHRONICITY: ICD-10-CM

## 2019-01-01 DIAGNOSIS — M70.62 TROCHANTERIC BURSITIS OF LEFT HIP: ICD-10-CM

## 2019-01-01 DIAGNOSIS — I10 ESSENTIAL HYPERTENSION: ICD-10-CM

## 2019-01-01 DIAGNOSIS — G89.29 CHRONIC BILATERAL LOW BACK PAIN WITHOUT SCIATICA: ICD-10-CM

## 2019-01-01 DIAGNOSIS — M16.12 OSTEOARTHRITIS OF LEFT HIP, UNSPECIFIED OSTEOARTHRITIS TYPE: ICD-10-CM

## 2019-01-01 DIAGNOSIS — M48.061 FORAMINAL STENOSIS OF LUMBAR REGION: ICD-10-CM

## 2019-01-01 DIAGNOSIS — I50.32 CHRONIC DIASTOLIC CONGESTIVE HEART FAILURE (H): ICD-10-CM

## 2019-01-01 DIAGNOSIS — N28.9 KIDNEY FUNCTION ABNORMAL: ICD-10-CM

## 2019-01-01 DIAGNOSIS — R29.898 WEAKNESS OF BOTH HIPS: ICD-10-CM

## 2019-01-01 DIAGNOSIS — M81.0 AGE-RELATED OSTEOPOROSIS WITHOUT CURRENT PATHOLOGICAL FRACTURE: ICD-10-CM

## 2019-01-01 DIAGNOSIS — M79.18 MYOFASCIAL PAIN: ICD-10-CM

## 2019-01-01 DIAGNOSIS — M54.32 SCIATICA OF LEFT SIDE: ICD-10-CM

## 2019-01-01 DIAGNOSIS — M81.0 OSTEOPOROSIS: ICD-10-CM

## 2019-01-01 DIAGNOSIS — M48.062 LUMBAR STENOSIS WITH NEUROGENIC CLAUDICATION: ICD-10-CM

## 2019-01-01 DIAGNOSIS — R19.7 DIARRHEA OF PRESUMED INFECTIOUS ORIGIN: ICD-10-CM

## 2019-01-01 DIAGNOSIS — N28.9 FUNCTION KIDNEY DECREASED: ICD-10-CM

## 2019-01-01 DIAGNOSIS — Z92.29 PERSONAL HISTORY OF OTHER DRUG THERAPY: ICD-10-CM

## 2019-01-01 LAB
25(OH)D3 SERPL-MCNC: 48.6 NG/ML (ref 30–80)
ALBUMIN SERPL-MCNC: 4.3 G/DL (ref 3.5–5)
ALP SERPL-CCNC: 49 U/L (ref 45–120)
ALT SERPL W P-5'-P-CCNC: 21 U/L (ref 0–45)
ANION GAP SERPL CALCULATED.3IONS-SCNC: 12 MMOL/L (ref 5–18)
ANION GAP SERPL CALCULATED.3IONS-SCNC: 13 MMOL/L (ref 5–18)
ANION GAP SERPL CALCULATED.3IONS-SCNC: 15 MMOL/L (ref 5–18)
AST SERPL W P-5'-P-CCNC: 28 U/L (ref 0–40)
BACTERIA SPEC CULT: NORMAL
BILIRUB SERPL-MCNC: 0.5 MG/DL (ref 0–1)
BUN SERPL-MCNC: 45 MG/DL (ref 8–28)
BUN SERPL-MCNC: 48 MG/DL (ref 8–28)
BUN SERPL-MCNC: 51 MG/DL (ref 8–28)
C DIFF TOX B STL QL: NEGATIVE
CALCIUM SERPL-MCNC: 11 MG/DL (ref 8.5–10.5)
CALCIUM SERPL-MCNC: 8.9 MG/DL (ref 8.5–10.5)
CALCIUM SERPL-MCNC: 9.6 MG/DL (ref 8.5–10.5)
CHLORIDE BLD-SCNC: 102 MMOL/L (ref 98–107)
CHLORIDE BLD-SCNC: 108 MMOL/L (ref 98–107)
CHLORIDE BLD-SCNC: 108 MMOL/L (ref 98–107)
CO2 SERPL-SCNC: 19 MMOL/L (ref 22–31)
CO2 SERPL-SCNC: 21 MMOL/L (ref 22–31)
CO2 SERPL-SCNC: 22 MMOL/L (ref 22–31)
CREAT SERPL-MCNC: 1.64 MG/DL (ref 0.6–1.1)
CREAT SERPL-MCNC: 1.82 MG/DL (ref 0.6–1.1)
CREAT SERPL-MCNC: 1.9 MG/DL (ref 0.6–1.1)
GFR SERPL CREATININE-BSD FRML MDRD: 25 ML/MIN/1.73M2
GFR SERPL CREATININE-BSD FRML MDRD: 26 ML/MIN/1.73M2
GFR SERPL CREATININE-BSD FRML MDRD: 29 ML/MIN/1.73M2
GLUCOSE BLD-MCNC: 110 MG/DL (ref 70–125)
GLUCOSE BLD-MCNC: 115 MG/DL (ref 70–125)
GLUCOSE BLD-MCNC: 116 MG/DL (ref 70–125)
INR PPP: 1.9 (ref 0.9–1.1)
INR PPP: 2 (ref 0.9–1.1)
INR PPP: 2.3 (ref 0.9–1.1)
INR PPP: 2.5 (ref 0.9–1.1)
INR PPP: 2.6 (ref 0.9–1.1)
INR PPP: 2.6 (ref 0.9–1.1)
INR PPP: 2.7 (ref 0.9–1.1)
INR PPP: 3.1 (ref 0.9–1.1)
INR PPP: 3.1 (ref 0.9–1.1)
INR PPP: 3.3 (ref 0.9–1.1)
INR PPP: 3.9 (ref 0.9–1.1)
INR PPP: 5.3 (ref 0.9–1.1)
INR PPP: 7.26 (ref 0.9–1.1)
O+P STL MICRO: NORMAL
POC INR - HE - HISTORICAL: 3.1 (ref 0.9–1.1)
POTASSIUM BLD-SCNC: 4 MMOL/L (ref 3.5–5)
POTASSIUM BLD-SCNC: 4.6 MMOL/L (ref 3.5–5)
POTASSIUM BLD-SCNC: 4.7 MMOL/L (ref 3.5–5)
PROT SERPL-MCNC: 7.1 G/DL (ref 6–8)
RIBOTYPE 027/NAP1/BI: NORMAL
SHIGA TOXIN 1: NEGATIVE
SHIGA TOXIN 2: NEGATIVE
SODIUM SERPL-SCNC: 139 MMOL/L (ref 136–145)
SODIUM SERPL-SCNC: 140 MMOL/L (ref 136–145)
SODIUM SERPL-SCNC: 141 MMOL/L (ref 136–145)
T4 FREE SERPL-MCNC: 1 NG/DL (ref 0.7–1.8)
TSH SERPL DL<=0.005 MIU/L-ACNC: 0.17 UIU/ML (ref 0.3–5)

## 2019-01-01 RX ORDER — LISINOPRIL 20 MG/1
20 TABLET ORAL 2 TIMES DAILY
Qty: 180 TABLET | Refills: 3 | Status: SHIPPED | OUTPATIENT
Start: 2019-01-01

## 2019-01-01 RX ORDER — WARFARIN SODIUM 4 MG/1
TABLET ORAL
Qty: 90 TABLET | Refills: 1 | Status: SHIPPED | OUTPATIENT
Start: 2019-01-01

## 2019-01-01 RX ORDER — DILTIAZEM HYDROCHLORIDE 120 MG/1
240 CAPSULE, COATED, EXTENDED RELEASE ORAL DAILY
Qty: 180 CAPSULE | Refills: 3 | Status: SHIPPED | OUTPATIENT
Start: 2019-01-01

## 2019-01-01 RX ORDER — TIMOLOL MALEATE 5 MG/ML
1 SOLUTION/ DROPS OPHTHALMIC DAILY
Refills: 3 | Status: SHIPPED | COMMUNITY
Start: 2019-04-01

## 2019-01-01 ASSESSMENT — MIFFLIN-ST. JEOR
SCORE: 702.11
SCORE: 727.03
SCORE: 755.16
SCORE: 746.08

## 2019-01-03 ENCOUNTER — AMBULATORY - HEALTHEAST (OUTPATIENT)
Dept: LAB | Facility: CLINIC | Age: 84
End: 2019-01-03

## 2019-01-03 ENCOUNTER — COMMUNICATION - HEALTHEAST (OUTPATIENT)
Dept: ANTICOAGULATION | Facility: CLINIC | Age: 84
End: 2019-01-03

## 2019-01-03 DIAGNOSIS — I48.21 PERMANENT ATRIAL FIBRILLATION (H): ICD-10-CM

## 2019-01-03 LAB — INR PPP: 2.4 (ref 0.9–1.1)

## 2019-01-08 ENCOUNTER — OFFICE VISIT - HEALTHEAST (OUTPATIENT)
Dept: CARDIOLOGY | Facility: CLINIC | Age: 84
End: 2019-01-08

## 2019-01-08 DIAGNOSIS — I50.32 CHRONIC DIASTOLIC CONGESTIVE HEART FAILURE (H): ICD-10-CM

## 2019-01-08 DIAGNOSIS — I48.21 PERMANENT ATRIAL FIBRILLATION (H): ICD-10-CM

## 2019-01-08 ASSESSMENT — MIFFLIN-ST. JEOR: SCORE: 750.62

## 2019-02-02 ENCOUNTER — COMMUNICATION - HEALTHEAST (OUTPATIENT)
Dept: CARDIOLOGY | Facility: CLINIC | Age: 84
End: 2019-02-02

## 2019-02-02 DIAGNOSIS — I50.9 CONGESTIVE HEART FAILURE (H): ICD-10-CM

## 2019-02-10 ENCOUNTER — COMMUNICATION - HEALTHEAST (OUTPATIENT)
Dept: FAMILY MEDICINE | Facility: CLINIC | Age: 84
End: 2019-02-10

## 2019-02-10 DIAGNOSIS — I10 ESSENTIAL HYPERTENSION: ICD-10-CM

## 2019-02-10 DIAGNOSIS — I48.21 PERMANENT ATRIAL FIBRILLATION (H): ICD-10-CM

## 2019-02-11 ENCOUNTER — COMMUNICATION - HEALTHEAST (OUTPATIENT)
Dept: ANTICOAGULATION | Facility: CLINIC | Age: 84
End: 2019-02-11

## 2019-02-11 ENCOUNTER — AMBULATORY - HEALTHEAST (OUTPATIENT)
Dept: LAB | Facility: CLINIC | Age: 84
End: 2019-02-11

## 2019-02-11 DIAGNOSIS — I48.21 PERMANENT ATRIAL FIBRILLATION (H): ICD-10-CM

## 2019-02-11 LAB — INR PPP: 3.3 (ref 0.9–1.1)

## 2019-02-15 ENCOUNTER — RECORDS - HEALTHEAST (OUTPATIENT)
Dept: ADMINISTRATIVE | Facility: OTHER | Age: 84
End: 2019-02-15

## 2019-02-26 ENCOUNTER — AMBULATORY - HEALTHEAST (OUTPATIENT)
Dept: LAB | Facility: CLINIC | Age: 84
End: 2019-02-26

## 2019-02-26 ENCOUNTER — COMMUNICATION - HEALTHEAST (OUTPATIENT)
Dept: ANTICOAGULATION | Facility: CLINIC | Age: 84
End: 2019-02-26

## 2019-02-26 DIAGNOSIS — I48.21 PERMANENT ATRIAL FIBRILLATION (H): ICD-10-CM

## 2019-02-26 LAB — INR PPP: 2.4 (ref 0.9–1.1)

## 2019-03-25 ENCOUNTER — AMBULATORY - HEALTHEAST (OUTPATIENT)
Dept: LAB | Facility: CLINIC | Age: 84
End: 2019-03-25

## 2019-03-25 ENCOUNTER — COMMUNICATION - HEALTHEAST (OUTPATIENT)
Dept: FAMILY MEDICINE | Facility: CLINIC | Age: 84
End: 2019-03-25

## 2019-03-25 ENCOUNTER — COMMUNICATION - HEALTHEAST (OUTPATIENT)
Dept: ANTICOAGULATION | Facility: CLINIC | Age: 84
End: 2019-03-25

## 2019-03-25 DIAGNOSIS — I48.21 PERMANENT ATRIAL FIBRILLATION (H): ICD-10-CM

## 2019-03-25 DIAGNOSIS — I48.91 A-FIB (H): ICD-10-CM

## 2019-03-25 LAB — INR PPP: 2.4 (ref 0.9–1.1)

## 2019-04-17 ENCOUNTER — COMMUNICATION - HEALTHEAST (OUTPATIENT)
Dept: ANTICOAGULATION | Facility: CLINIC | Age: 84
End: 2019-04-17

## 2019-04-17 ENCOUNTER — RECORDS - HEALTHEAST (OUTPATIENT)
Dept: GENERAL RADIOLOGY | Facility: CLINIC | Age: 84
End: 2019-04-17

## 2019-04-17 ENCOUNTER — OFFICE VISIT - HEALTHEAST (OUTPATIENT)
Dept: FAMILY MEDICINE | Facility: CLINIC | Age: 84
End: 2019-04-17

## 2019-04-17 DIAGNOSIS — M16.12 OSTEOARTHRITIS OF LEFT HIP, UNSPECIFIED OSTEOARTHRITIS TYPE: ICD-10-CM

## 2019-04-17 DIAGNOSIS — I50.32 CHRONIC DIASTOLIC CONGESTIVE HEART FAILURE (H): ICD-10-CM

## 2019-04-17 DIAGNOSIS — N18.4 STAGE 4 CHRONIC KIDNEY DISEASE (H): ICD-10-CM

## 2019-04-17 DIAGNOSIS — I10 ESSENTIAL HYPERTENSION: ICD-10-CM

## 2019-04-17 DIAGNOSIS — M16.12 UNILATERAL PRIMARY OSTEOARTHRITIS, LEFT HIP: ICD-10-CM

## 2019-04-17 DIAGNOSIS — I48.21 PERMANENT ATRIAL FIBRILLATION (H): ICD-10-CM

## 2019-04-17 LAB
ALBUMIN SERPL-MCNC: 4.4 G/DL (ref 3.5–5)
ALP SERPL-CCNC: 37 U/L (ref 45–120)
ALT SERPL W P-5'-P-CCNC: 14 U/L (ref 0–45)
ANION GAP SERPL CALCULATED.3IONS-SCNC: 15 MMOL/L (ref 5–18)
AST SERPL W P-5'-P-CCNC: 22 U/L (ref 0–40)
BILIRUB SERPL-MCNC: 0.5 MG/DL (ref 0–1)
BUN SERPL-MCNC: 62 MG/DL (ref 8–28)
CALCIUM SERPL-MCNC: 10.2 MG/DL (ref 8.5–10.5)
CHLORIDE BLD-SCNC: 106 MMOL/L (ref 98–107)
CO2 SERPL-SCNC: 21 MMOL/L (ref 22–31)
CREAT SERPL-MCNC: 2.21 MG/DL (ref 0.6–1.1)
ERYTHROCYTE [DISTWIDTH] IN BLOOD BY AUTOMATED COUNT: 12.3 % (ref 11–14.5)
GFR SERPL CREATININE-BSD FRML MDRD: 21 ML/MIN/1.73M2
GLUCOSE BLD-MCNC: 111 MG/DL (ref 70–125)
HCT VFR BLD AUTO: 34.4 % (ref 35–47)
HGB BLD-MCNC: 11.5 G/DL (ref 12–16)
INR PPP: 2.6 (ref 0.9–1.1)
MCH RBC QN AUTO: 32 PG (ref 27–34)
MCHC RBC AUTO-ENTMCNC: 33.5 G/DL (ref 32–36)
MCV RBC AUTO: 96 FL (ref 80–100)
PLATELET # BLD AUTO: 252 THOU/UL (ref 140–440)
PMV BLD AUTO: 6.3 FL (ref 7–10)
POTASSIUM BLD-SCNC: 5 MMOL/L (ref 3.5–5)
PROT SERPL-MCNC: 6.9 G/DL (ref 6–8)
RBC # BLD AUTO: 3.6 MILL/UL (ref 3.8–5.4)
SODIUM SERPL-SCNC: 142 MMOL/L (ref 136–145)
WBC: 6.1 THOU/UL (ref 4–11)

## 2019-04-23 ENCOUNTER — COMMUNICATION - HEALTHEAST (OUTPATIENT)
Dept: FAMILY MEDICINE | Facility: CLINIC | Age: 84
End: 2019-04-23

## 2019-04-25 ENCOUNTER — COMMUNICATION - HEALTHEAST (OUTPATIENT)
Dept: FAMILY MEDICINE | Facility: CLINIC | Age: 84
End: 2019-04-25

## 2019-04-25 ENCOUNTER — AMBULATORY - HEALTHEAST (OUTPATIENT)
Dept: FAMILY MEDICINE | Facility: CLINIC | Age: 84
End: 2019-04-25

## 2019-04-25 DIAGNOSIS — R79.9 ELEVATED BUN: ICD-10-CM

## 2019-04-25 DIAGNOSIS — N28.9 FUNCTION KIDNEY DECREASED: ICD-10-CM

## 2020-01-01 ENCOUNTER — AMBULATORY - HEALTHEAST (OUTPATIENT)
Dept: LAB | Facility: CLINIC | Age: 85
End: 2020-01-01

## 2020-01-01 ENCOUNTER — COMMUNICATION - HEALTHEAST (OUTPATIENT)
Dept: ANTICOAGULATION | Facility: CLINIC | Age: 85
End: 2020-01-01

## 2020-01-01 ENCOUNTER — HOME CARE/HOSPICE - HEALTHEAST (OUTPATIENT)
Dept: HOSPICE | Facility: HOSPICE | Age: 85
End: 2020-01-01

## 2020-01-01 ENCOUNTER — OFFICE VISIT - HEALTHEAST (OUTPATIENT)
Dept: CARDIOLOGY | Facility: CLINIC | Age: 85
End: 2020-01-01

## 2020-01-01 ENCOUNTER — COMMUNICATION - HEALTHEAST (OUTPATIENT)
Dept: CARDIOLOGY | Facility: CLINIC | Age: 85
End: 2020-01-01

## 2020-01-01 ENCOUNTER — OFFICE VISIT - HEALTHEAST (OUTPATIENT)
Dept: FAMILY MEDICINE | Facility: CLINIC | Age: 85
End: 2020-01-01

## 2020-01-01 ENCOUNTER — COMMUNICATION - HEALTHEAST (OUTPATIENT)
Dept: FAMILY MEDICINE | Facility: CLINIC | Age: 85
End: 2020-01-01

## 2020-01-01 ENCOUNTER — SURGERY - HEALTHEAST (OUTPATIENT)
Dept: CARDIOLOGY | Facility: CLINIC | Age: 85
End: 2020-01-01

## 2020-01-01 DIAGNOSIS — I48.91 A-FIB (H): ICD-10-CM

## 2020-01-01 DIAGNOSIS — I48.21 PERMANENT ATRIAL FIBRILLATION (H): ICD-10-CM

## 2020-01-01 DIAGNOSIS — M48.062 LUMBAR STENOSIS WITH NEUROGENIC CLAUDICATION: ICD-10-CM

## 2020-01-01 DIAGNOSIS — I50.9 CONGESTIVE HEART FAILURE (H): ICD-10-CM

## 2020-01-01 DIAGNOSIS — R52 PAIN: ICD-10-CM

## 2020-01-01 LAB
INR PPP: 2.4 (ref 0.9–1.1)
INR PPP: 2.6 (ref 0.9–1.1)
INR PPP: 2.6 (ref 0.9–1.1)
INR PPP: 2.8 (ref 0.9–1.1)
INR PPP: 5.1 (ref 0.9–1.1)

## 2020-01-01 RX ORDER — GABAPENTIN 100 MG/1
CAPSULE ORAL
Qty: 90 CAPSULE | Refills: 3 | Status: SHIPPED | OUTPATIENT
Start: 2020-01-01

## 2020-01-01 RX ORDER — CARVEDILOL 25 MG/1
TABLET ORAL
Qty: 180 TABLET | Refills: 0 | Status: SHIPPED | OUTPATIENT
Start: 2020-01-01

## 2020-01-01 RX ORDER — ACETAMINOPHEN 500 MG
1000 TABLET ORAL 3 TIMES DAILY
Status: SHIPPED | COMMUNITY
Start: 2020-01-01

## 2020-01-01 RX ORDER — FUROSEMIDE 40 MG
TABLET ORAL
Qty: 180 TABLET | Refills: 0 | Status: SHIPPED | OUTPATIENT
Start: 2020-01-01

## 2020-04-29 ENCOUNTER — COMMUNICATION - HEALTHEAST (OUTPATIENT)
Dept: ANTICOAGULATION | Facility: CLINIC | Age: 85
End: 2020-04-29

## 2020-04-29 DIAGNOSIS — I48.91 ATRIAL FIBRILLATION WITH RAPID VENTRICULAR RESPONSE (H): ICD-10-CM

## 2020-05-06 ENCOUNTER — AMBULATORY - HEALTHEAST (OUTPATIENT)
Dept: FAMILY MEDICINE | Facility: CLINIC | Age: 85
End: 2020-05-06

## 2021-05-25 ENCOUNTER — RECORDS - HEALTHEAST (OUTPATIENT)
Dept: ADMINISTRATIVE | Facility: CLINIC | Age: 86
End: 2021-05-25

## 2021-05-26 ENCOUNTER — RECORDS - HEALTHEAST (OUTPATIENT)
Dept: ADMINISTRATIVE | Facility: CLINIC | Age: 86
End: 2021-05-26

## 2021-05-27 ENCOUNTER — RECORDS - HEALTHEAST (OUTPATIENT)
Dept: ADMINISTRATIVE | Facility: CLINIC | Age: 86
End: 2021-05-27

## 2021-05-27 NOTE — TELEPHONE ENCOUNTER
ANTICOAGULATION  MANAGEMENT    Assessment     Today's INR result of 2.6 is Therapeutic (goal INR of 2.0-3.0)        Warfarin taken as previously instructed    No new diet changes affecting INR    No new medication/supplements affecting INR    Continues to tolerate warfarin with no reported s/s of bleeding or thromboembolism     Previous INR was Therapeutic     Patient established care with new provider today.    Plan:     Spoke with Liz regarding INR result and instructed:     Warfarin Dosing Instructions:  Continue current warfarin dose    2 mg every Mon; 4 mg all other days      (0 % change)    Instructed patient to follow up no later than: 4-6 weeks,appointment made.    Education provided: importance of therapeutic range and target INR goal and significance of current INR result    Liz verbalizes understanding and agrees to warfarin dosing plan.    Instructed to call the [facility] for any changes, questions or concerns. (#[phone])   ?   Deena Shah RN    Subjective/Objective:      Liz Gonzalez, a 89 y.o. female is on warfarin.     Liz reports:     Home warfarin dose: as updated on anticoagulation calendar per template     Missed doses: No     Medication changes:  No     S/S of bleeding or thromboembolism:  No     New Injury or illness:  No     Changes in diet or alcohol consumption:  No     Upcoming surgery, procedure or cardioversion:  No    Anticoagulation Episode Summary     Current INR goal:   2.0-3.0   TTR:   75.9 % (4.4 y)   Next INR check:   5/29/2019   INR from last check:   2.60 (4/17/2019)   Weekly max warfarin dose:      Target end date:      INR check location:      Preferred lab:      Send INR reminders to:   ANTICOAGULATION POOL C (DTN,VAD,CGR,GAV)    Indications    A-fib (H) (Resolved) [I48.91]           Comments:            Anticoagulation Care Providers     Provider Role Specialty Phone number    Natasha Nunez MD Referring Family Medicine [phone]

## 2021-05-27 NOTE — TELEPHONE ENCOUNTER
Lab Results   Component Value Date    INR 2.40 (H) 03/25/2019    INR 2.40 (H) 02/26/2019    INR 3.30 (H) 02/11/2019       Patient's current Warfarin doses:    2 mg every Mon; 4 mg all other days       Next INR check is on 3/26/19      Patient's last OV with PCP was on 2/28/18    Warfarin prescription 2 month supply  sent to patient's pharmacy today due to last OV with PCP was over a year ago.    Deena Shah RN

## 2021-05-27 NOTE — TELEPHONE ENCOUNTER
Who is calling:  Patient   Reason for Call:  Wants to know dosing instructions   Date of last appointment with primary care:   Okay to leave a detailed message: No      Patient is available before 3:30pm or after 4pm.

## 2021-05-27 NOTE — TELEPHONE ENCOUNTER
ANTICOAGULATION  MANAGEMENT    Assessment     Today's INR result of 2.4 is Therapeutic (goal INR of 2.0-3.0)        Warfarin taken as previously instructed    No new diet changes affecting INR    No new medication/supplements affecting INR    Continues to tolerate warfarin with no reported s/s of bleeding or thromboembolism     Previous INR was Therapeutic     Patient made aware that she is due for annual visit with PCP- she stated that she needs to establish care with another provider due to current PCP is leaving the clinic.    Plan:     Spoke with Liz regarding INR result and instructed:     Warfarin Dosing Instructions:  Continue current warfarin dose    2 mg every Mon; 4 mg all other days      (0 % change)    Instructed patient to follow up no later than:4  weeks    Education provided: importance of therapeutic range and target INR goal and significance of current INR result    Liz verbalizes understanding and agrees to warfarin dosing plan.    Instructed to call the [facility] for any changes, questions or concerns. (#[phone])   ?   Deena Shah RN    Subjective/Objective:      Liz Gonzalez, a 89 y.o. female is on warfarin.     Liz reports:     Home warfarin dose: as updated on anticoagulation calendar per template     Missed doses: No     Medication changes:  No     S/S of bleeding or thromboembolism:  No     New Injury or illness:  No     Changes in diet or alcohol consumption:  No     Upcoming surgery, procedure or cardioversion:  No    Anticoagulation Episode Summary     Current INR goal:   2.0-3.0   TTR:   75.5 % (4.3 y)   Next INR check:   4/22/2019   INR from last check:   2.40 (3/25/2019)   Weekly max warfarin dose:      Target end date:      INR check location:      Preferred lab:      Send INR reminders to:   ANTICOAGULATION POOL C (DTN,VAD,CGR,GAV)    Indications    A-fib (H) (Resolved) [I48.91]           Comments:            Anticoagulation Care Providers     Provider  Role Specialty Phone number    Natasha Nunez MD Referring Family Medicine [phone]

## 2021-05-27 NOTE — TELEPHONE ENCOUNTER
RN cannot approve Refill Request    RN can NOT refill this medication med is not covered by policy/route to provider.       Liliya John, [facility] Triage/Med Refill 3/25/2019    Requested Prescriptions   Pending Prescriptions Disp Refills     warfarin (COUMADIN/JANTOVEN) 4 MG tablet [Pharmacy Med Name: WARFARIN SODIUM 4 MG TABLET] 90 tablet 1     Sig: TAKE 0.5-1 TABLETS (2-4 MG TOTAL) BY MOUTH DAILY. ADJUST DOSE PER INR RESULTS AS INSTRUCTED.    Warfarin Refill Protocol  Failed - 3/25/2019  1:40 AM       Failed - Provider visit in last year    Last office visit with prescriber/PCP: 10/1/2015 Natasha Nunez MD OR same dept: Visit date not found OR same specialty: 9/12/2017 Hailey Naqvi, SHUN  Last physical: 2/28/2018 Last MTM visit: Visit date not found    Next appt within 3 mo: Visit date not found Next physical within 3 mo: Visit date not found  Prescriber OR PCP: Natasha Nunez MD  Last diagnosis associated with med order: 1. A-fib (H)  - warfarin (COUMADIN/JANTOVEN) 4 MG tablet [Pharmacy Med Name: WARFARIN SODIUM 4 MG TABLET]; Take 0.5-1 tablets (2-4 mg total) by mouth daily. Adjust dose per INR results as instructed.  Dispense: 90 tablet; Refill: 1    If protocol passes may refill for 6 months if within 3 months of last provider visit (or a total of 9 months).         Failed -  Route to appropriate pool/provider    Last Anticoagulation Summary:   Anticoagulation Episode Summary     Current INR goal:   2.0-3.0   TTR:   75.1 % (4.2 y)   Next INR check:   3/26/2019   INR from last check:   2.40 (2/26/2019)   Weekly max warfarin dose:      Target end date:      INR check location:      Preferred lab:      Send INR reminders to:   ANTICOAGULATION POOL C (DTN,VAD,CGR,GAV)    Indications    A-fib (H) (Resolved) [I48.91]           Comments:            Anticoagulation Care Providers     Provider Role Specialty Phone number    Natasha Nunez MD Referring Family Medicine [phone]

## 2021-05-28 ENCOUNTER — RECORDS - HEALTHEAST (OUTPATIENT)
Dept: ADMINISTRATIVE | Facility: CLINIC | Age: 86
End: 2021-05-28

## 2021-05-28 NOTE — TELEPHONE ENCOUNTER
Pt and daughter have a follow up appointment scheduled tomorrow 5/16 to go over lab results with Hailey Naqvi.

## 2021-05-28 NOTE — PROGRESS NOTES
ASSESSMENT:  1. Osteoarthritis of left hip, unspecified osteoarthritis type  XR Pelvis W 2 Vw Hip Left   2. Essential hypertension  Comprehensive Metabolic Panel    HM2(CBC w/o Differential)   3. Chronic kidney disease, stage IV (severe)      4. Stage 4 chronic kidney disease   HM2(CBC w/o Differential)   5. Permanent atrial fibrillation (H)  INR         PLAN:  Xray without fracture, will discuss options for further pain management with patient.   Blood pressure goal range, due for labs today.  Chronic kidney disease, monitor labs keep blood pressure at goal range.  A. fib, on chronic Coumadin needs INR today.  Continue following with anticoagulation management.  Follows with cardiologist annually.  No problem-specific Assessment & Plan notes found for this encounter.    I have had an Advance Directives discussion with the patient.  There are no Patient Instructions on file for this visit.    Orders Placed This Encounter   Procedures     XR Pelvis W 2 Vw Hip Left     Standing Status:   Future     Number of Occurrences:   1     Standing Expiration Date:   4/17/2020     Order Specific Question:   Can the procedure be changed per Radiologist protocol?     Answer:   Yes     Comprehensive Metabolic Panel     HM2(CBC w/o Differential)     There are no discontinued medications.    No follow-ups on file.    CHIEF COMPLAINT:  Chief Complaint   Patient presents with     Establish Care     Medication Management       HISTORY OF PRESENT ILLNESS:  Liz is a 89 y.o. female she is to [facility].  Patient is a previous patient of Dr. Nunez.  She feels she is overall doing well for her age.  She lives in assisted living facility and walks up and down the halls several times a day.  This adds up to about a mile.  She does not have trouble with her ADLs.  She has a granddaughter who comes to help her clean.  Patient has a history of Restless legs, hypertension, A. fib, osteoarthritis, artery disease, lumbar stenosis, stage  IV chronic kidney disease.  She feels she is managing well.  Restless legs have not improved with medications so she just manages at home.  She is due for an updated DEXA scan this next year.  Blood pressure is in goal range today on her medications.  She complains of some low back pain that is fairly persistent.  We discussed evaluating her hip to see whether or not she has more severe arthritis in the left hip given worsening hip and leg pain on that side.  It could be her sciatica is radiating she might benefit from PT or visit with the spine center.  We can get the x-ray and discuss options further.  She is to have an x-ray of the hip at some time.  Monitor labs today, no further complaints outside of pain in the hip and low back which is chronic for her.  Patient continues with Prolia injections every 6 months and follows with rheumatology for this. On chronic warfarin for atrial fibrillations. Follows with Dr. Urbina annually.    REVIEW OF SYSTEMS:        All other systems are negative  PFSH:  Reviewed, no changes      TOBACCO USE:  Social History     Tobacco Use   Smoking Status Former Smoker     Packs/day: 0.25     Years: 50.00     Pack years: 12.50     Last attempt to quit: 1998     Years since quittin.2   Smokeless Tobacco Never Used       VITALS:  Vitals:    19 1048   BP: 124/74   Patient Site: Left Arm   Patient Position: Sitting   Cuff Size: Adult Regular   Pulse: (!) 118   Resp: 14   Weight: 102 lb 6 oz (46.4 kg)     Wt Readings from Last 3 Encounters:   19 102 lb 6 oz (46.4 kg)   19 104 lb (47.2 kg)   18 103 lb (46.7 kg)       PHYSICAL EXAM:   /74 (Patient Site: Left Arm, Patient Position: Sitting, Cuff Size: Adult Regular)   Pulse (!) 118   Resp 14   Wt 102 lb 6 oz (46.4 kg)   BMI 22.15 kg/m     General appearance: alert, appears stated age and cooperative  Head: Normocephalic, without obvious abnormality, atraumatic  Eyes: conjunctivae/corneas clear.  PERRL, EOM's intact. Fundi benign.  Ears: normal TM's and external ear canals both ears  Nose: Nares normal. Septum midline. Mucosa normal. No drainage or sinus tenderness.  Throat: lips, mucosa, and tongue normal; teeth and gums normal  Neck: no adenopathy, no carotid bruit, no JVD, supple, symmetrical, trachea midline and thyroid not enlarged, symmetric, no tenderness/mass/nodules  Lungs: clear to auscultation bilaterally  Heart: irregularly irregular rhythm, no murmurs rubs gallops  Extremities: extremities normal, atraumatic, no cyanosis or edema  Pulses: 2+ and symmetric  Skin: Skin color, texture, turgor normal. No rashes or lesions  Neurologic: Grossly normal    DATA REVIEWED:  Additional History from Old Records Summarized (2): previous annual wellness exam  Decision to Obtain Records (1): 0  Radiology Tests Summarized or Ordered (1): 1  Labs Reviewed or Ordered (1): 1  Medicine Test Summarized or Ordered (1): 0  Independent Review of EKG or X-RAY(2 each): no fracture    The visit lasted a total of 40 minutes face to face with the patient. Over 50% of the time was spent counseling and educating the patient about plan of care.    MEDICATIONS:  Current Outpatient Medications   Medication Sig Dispense Refill     acetaminophen (TYLENOL) 325 MG tablet Take 650 mg by mouth as needed.        ANTIOX#10/OM3/DHA/EPA/LUT/ZEAX (I-CAPS ORAL) Take 1 tablet by mouth 2 (two) times a day.       CALCIUM CARBONATE/VITAMIN D3 (CALTRATE 600 + D ORAL) Take 1 tablet by mouth daily.       carvedilol (COREG) 25 MG tablet TAKE ONE TABLET BY MOUTH TWICE A DAY WITH MEALS 180 tablet 2     cholecalciferol, vitamin D3, (CHOLECALCIFEROL) 1,000 unit tablet Take 1,000 Units by mouth bedtime.        denosumab (PROLIA) 60 mg/mL Syrg Inject 60 mg under the skin every 6 (six) months. Outpatient Injection only.  Due around July 2014       diltiazem (CARDIZEM CD) 120 MG 24 hr capsule TAKE 2 CAPSULES (240 MG TOTAL) BY MOUTH DAILY. 180 capsule 0      FOLIC ACID/MV,FE,OTHER MIN (CENTRUM ORAL) Take 1 tablet by mouth daily.       furosemide (LASIX) 40 MG tablet TAKE ONE TABLET BY MOUTH TWICE A DAY AT 9 A.M. AND 6 P.M. 180 tablet 1     glucosamine-chondroitin 500-400 mg tablet Take 1 tablet by mouth 2 (two) times a day.       latanoprost (XALATAN) 0.005 % ophthalmic solution Administer 1 drop to both eyes bedtime.       lisinopril (PRINIVIL,ZESTRIL) 20 MG tablet TAKE ONE TABLET BY MOUTH TWICE A  tablet 0     nitroglycerin (NITROSTAT) 0.4 MG SL tablet Place 1 tablet (0.4 mg total) under the tongue every 5 (five) minutes as needed for chest pain (pt has never used). 25 tablet 11     timolol (BETIMOL) 0.5 % ophthalmic solution Administer 1 drop into the left eye daily.       warfarin (COUMADIN/JANTOVEN) 4 MG tablet TAKE 0.5-1 TABLETS (2-4 MG TOTAL) BY MOUTH DAILY. ADJUST DOSE PER INR RESULTS AS INSTRUCTED. 60 tablet 0     Current Facility-Administered Medications   Medication Dose Route Frequency Provider Last Rate Last Dose     denosumab 60 mg (PROLIA 60 mg/ml)  60 mg Subcutaneous Q6 Months Hank Austin MD   60 mg at 12/10/18 1017       This note has been dictated using voice recognition software. Any grammatical or context distortions are unintentional and inherent to the software

## 2021-05-28 NOTE — TELEPHONE ENCOUNTER
Reason contacted:  Imaging results  Information relayed:  See msg below  Additional questions:  No  Further follow-up needed:  No

## 2021-05-28 NOTE — TELEPHONE ENCOUNTER
Pt stated she has not taken any new medications and is following up with lab on 5/13 and PCP on 5/15.

## 2021-05-28 NOTE — TELEPHONE ENCOUNTER
Left message to call back for: test results/ lab results   Information to relay to patient:  Please relay message from provider below to patient

## 2021-05-28 NOTE — TELEPHONE ENCOUNTER
----- Message from SHUN Stevens sent at 4/25/2019  9:21 AM CDT -----  Kidney function had declined some with your recent lab work. Have there been any new medications in the last 6 months? I would like to recheck labs within a couple weeks and follow up based on those results. For recheck patient should make sure she is well hydrated prior to coming in.

## 2021-05-28 NOTE — TELEPHONE ENCOUNTER
----- Message from SHUN Stevens sent at 4/23/2019  3:13 PM CDT -----  Hip looked well on xray-not much hip arthritis but advanced arthritis in low back. Pain could be coming from back into hip. If she would like to see spine clinic for eval we can provide referral.

## 2021-05-28 NOTE — TELEPHONE ENCOUNTER
Refill Approved    Rx renewed per Medication Renewal Policy. Medication was last renewed on 2/12/19.    Liliya John, [facility] Triage/Med Refill 5/14/2019     Requested Prescriptions   Pending Prescriptions Disp Refills     diltiazem (CARDIZEM CD) 120 MG 24 hr capsule 180 capsule 0     Sig: Take 2 capsules (240 mg total) by mouth daily.       Calcium-Channel Blockers Protocol Passed - 5/13/2019  5:13 PM        Passed - PCP or prescribing provider visit in past 12 months or next 3 months     Last office visit with prescriber/PCP: 4/17/2019 Hailey Naqvi FNP OR [facility] dept: 4/17/2019 Hailey Naqvi FNP OR same specialty: 4/17/2019 Hailey Naqvi FNP  Last physical: Visit date not found Last MTM visit: Visit date not found   Next visit within 3 mo: Visit date not found  Next physical within 3 mo: Visit date not found  Prescriber OR PCP: SHUN Stevens  Last diagnosis associated with med order: 1. Permanent atrial fibrillation (H)  - diltiazem (CARDIZEM CD) 120 MG 24 hr capsule; Take 2 capsules (240 mg total) by mouth daily.  Dispense: 180 capsule; Refill: 0    2. Essential hypertension  - lisinopril (PRINIVIL,ZESTRIL) 20 MG tablet; Take 1 tablet (20 mg total) by mouth 2 (two) times a day.  Dispense: 180 tablet; Refill: 0    If protocol passes may refill for 12 months if within 3 months of last provider visit (or a total of 15 months).             Passed - Blood pressure filed in past 12 months     BP Readings from Last 1 Encounters:   04/17/19 124/74             lisinopril (PRINIVIL,ZESTRIL) 20 MG tablet 180 tablet 0     Sig: Take 1 tablet (20 mg total) by mouth 2 (two) times a day.       Ace Inhibitors Refill Protocol Passed - 5/13/2019  5:13 PM        Passed - PCP or prescribing provider visit in past 12 months       Last office visit with prescriber/PCP: 4/17/2019 Hailey Naqvi FNP OR [facility] de[facility]: 4/17/2019 Hailey Naqvi FNP OR same specialty: 4/17/2019 Hailey Naqvi FNP  Last physical: Visit date not found Last MTM visit: Visit  date not found   Next visit within 3 mo: Visit date not found  Next physical within 3 mo: Visit date not found  Prescriber OR PCP: SHUN Stevens  Last diagnosis associated with med order: 1. Permanent atrial fibrillation (H)  - diltiazem (CARDIZEM CD) 120 MG 24 hr capsule; Take 2 capsules (240 mg total) by mouth daily.  Dispense: 180 capsule; Refill: 0    2. Essential hypertension  - lisinopril (PRINIVIL,ZESTRIL) 20 MG tablet; Take 1 tablet (20 mg total) by mouth 2 (two) times a day.  Dispense: 180 tablet; Refill: 0    If protocol passes may refill for 12 months if within 3 months of last provider visit (or a total of 15 months).             Passed - Serum Potassium in past 12 months     Lab Results   Component Value Date    Potassium 4.7 05/13/2019             Passed - Blood pressure filed in past 12 months     BP Readings from Last 1 Encounters:   04/17/19 124/74             Passed - Serum Creatinine in past 12 months     Creatinine   Date Value Ref Range Status   05/13/2019 1.64 (H) 0.60 - 1.10 mg/dL Final

## 2021-05-28 NOTE — TELEPHONE ENCOUNTER
----- Message from SHUN Stevens sent at 5/15/2019  9:34 AM CDT -----  Kidney function improved on recheck. We will continue to monitor.

## 2021-05-28 NOTE — TELEPHONE ENCOUNTER
Referral Request  Type of referral: Spine clinic   Who s requesting: Patient, Patient's daughter is calling for the patient   Why the request: Back pain  Have you been seen for this request: Yes  Does patient have a preference on a group/provider? 4/25  Okay to leave a detailed message?  Yes  Please confirm with Mary at the number provided and when it can be scheduled.

## 2021-05-29 NOTE — PROGRESS NOTES
Assessment/Plan:      Diagnoses and all orders for this visit:    Trochanteric bursitis of left hip  -     Ambulatory referral to Physical Therapy    Myofascial pain  -     Ambulatory referral to Physical Therapy    Spondylolisthesis of lumbar region  -     Ambulatory referral to Physical Therapy    Foraminal stenosis of lumbar region  -     Ambulatory referral to Physical Therapy    Other idiopathic scoliosis, unspecified spinal region  -     Ambulatory referral to Physical Therapy        Assessment: Pleasant 89 y.o. female with a history of osteoporosis, hypertension, lung cancer, CHF, atrial fibrillation, coronary artery disease with:    1.  Chronic lumbar spine left gluteal pain and greater trochanter pain lateral leg pain to the knee.  Likely has a component of greater trochanteric bursitis on the left and myofascial pain.    2.  Lumbar scoliosis with an L4-5 degenerative spondylolisthesis with severe left foraminal stenosis on CT of the abdomen and pelvis from 1 year ago and old MRI.  This could represent proximal radicular symptoms as well.  Has responded well to L4-5 transforaminal epidural steroid injection in the past.      Discussion:    1.  I discussed the diagnosis and treatment options with the patient and her daughter-in-law.  We discussed the option of therapy along with injections further diagnostics   .    2.  Recommend a left greater trochanteric bursa injection today to help with her greater trochanter pain and lateral thigh pain.    3.  We will refer her to physical therapy for some core strengthening along with nerve glides.    4.  Can consider lumbar epidural in the future may need new imaging prior to injection.  We will see how she responds to treatment as above.    5.  Follow-up 1 month.        It was our pleasure caring for your patient today, if there any questions or concerns please do not hesitate to contact us.      Subjective:   Patient ID: Liz Gonzalez is a 89 y.o.  female.    History of Present Illness: Patient presents with daughter-in-law today for an evaluation of low back pain with left gluteal pain lateral leg pain to the knee.  Daughter-in-law provides some of the history today.  Patient presents with chronic low back pain with left gluteal and greater trochanter pain with radiation down the left leg to the knee.  Worse first thing in the morning.  When she gets up and gets moving things seem to improve throughout the day and better with Tylenol.  No numbness or tingling down the leg just pain.  Rated a 9/10 at worst her 10/10 today with palpation, 6/10 today in general and 3/10 at best.  She denies weakness in the leg but her daughter in law feels that there is weakness.    She does have a history of lumbar decompression.  In review of records she was seen by Rafael Maciel in the past.  Left L4-5 transforaminal epidural steroid injection was quite helpful in the past but her symptoms wax and wane over time and have worsened again.  Use a single-point cane or a walker for walking in her building.      Imaging: MRI report and images were personally reviewed and discussed with the patient.  A plastic model was utilized during the discussion.  MRI of lumbar spine personally reviewed.  This is from January 2017.  S-shaped lumbar scoliotic curve.  Lower lumbar spine reveals L4-5 spondylolisthesis that is degenerative with severe left and moderate to severe right foraminal stenosis left appears much worse than right for me today.  Severe facet arthropathy L5-S1 as well with mild foraminal stenosis.  There are degenerative changes more proximally as well including a large right paracentral foraminal disc protrusion at T12-L1 impinging on the right T12 nerve and severe left lateral recess stenosis L2-3 and L3-4.  As well as foraminal stenosis at those levels.    CT scan abdomen and pelvis personally reviewed from February 2018 shows spondylolisthesis L4-5 with significant  degenerative disc disease.  Severe left moderate right foraminal stenosis.    Review of Systems: No numbness or tingling but does have weakness in the left leg.  No bowel or bladder incontinence, headache, fevers, weight loss, difficulty with coordination or falls.    Past Medical History:   Diagnosis Date     Atrial fibrillation (H)      Cancer (H) 2010    squamous cell CA in lower right lobe     CHF (congestive heart failure) (H)      Coronary artery disease      Glaucoma      Hypertension      Hyponatremia      Lung cancer (H)      Osteoarthritis      Osteoporosis      Prinzmetal's angina (H)      Restless leg syndrome      Spinal stenosis      Social history: Now living in senior living facility.  Is getting more exercise.    The following portions of the patient's history were reviewed and updated as appropriate: allergies, current medications, past family history, past medical history, past social history, past surgical history and problem list.           Objective:   Physical Exam:    Vitals:    05/23/19 0952   BP: 133/80     Body mass index is 22.29 kg/m .      General: Alert and oriented with normal affect. Attention, knowledge, memory, and language are intact. No acute distress.  Able to heel toe stand with assist for balance.  Increased sway on Romberg.  Eyes: Sclerae are clear.  Respirations: Unlabored. CV: No lower extremity edema.  No cervical lymph adenopathy palpated.  Gait: Cautious, single-point cane right hand.  Tenderness to palpation left greater trochanter as well as left sciatic notch.  Full range of motion of the hips knees and ankles from seated.    Sensation is intact to light touch throughout the upper and lower extremities.  Reflexes are 2+ and symmetric in the biceps triceps and brachioradialis with negative Hoffmans. 2+ patellar and 1+ Achilles.  Negative seated straight leg raise bilaterally.    Manual muscle testing reveals:  Right /Left out of 5     5/5 hip flexors  5/5 knee  "flexors  5/5 knee extensors  5/5 ankle plantar flexors  5/5 ankle dorsiflexors  5/5  EHL     Procedure:    After discussing the risks and benefits of a Left Greater trochanteric bursa injection,  informed consent was obtained. Site was verified by the patient and myself prior to the injection.  The Greater trochanter was marked and prepped With ChloraPrep after point of maximal tenderness was palpated. 25-gauge 1.5\"  needle was advanced to the Greater trochanteric bursa. 3 mL's of injectate containing 2 mL's of 1% lidocaine and 1 mL containing 40 mg of Depo-Medrol was injected after aspiration was negative for heme. The patient tolerated the procedure well and there were no immediate complications.    Preprocedure pain: 10/10  Postprocedure pain: 5/10    "

## 2021-05-29 NOTE — TELEPHONE ENCOUNTER
ANTICOAGULATION  MANAGEMENT    Assessment     Today's INR result of 2.0 is Therapeutic (goal INR of 2.0-3.0)        Warfarin taken as previously instructed    No new diet changes affecting INR    No new medication/supplements affecting INR    Continues to tolerate warfarin with no reported s/s of bleeding or thromboembolism     Previous INR was Therapeutic    Plan:     Left a detailed message for Liz regarding INR result and instructed:     Warfarin Dosing Instructions:  Continue current warfarin dose 2 mg daily on Mon; and 4 mg daily rest of week  (0 % change)    Instructed patient to follow up no later than: 4 weeks    Education provided: importance of therapeutic range    Instructed to call the [facility] for any changes, questions or concerns. (#[phone])   ?   Gayla Ayon RN    Subjective/Objective:      Liz Gonzalez, a 89 y.o. female is on warfarin.     Liz reports:     Home warfarin dose: as updated on anticoagulation calendar per template     Missed doses: No     Medication changes:  No     S/S of bleeding or thromboembolism:  No     New Injury or illness:  No     Changes in diet or alcohol consumption:  No     Upcoming surgery, procedure or cardioversion:  No    Anticoagulation Episode Summary     Current INR goal:   2.0-3.0   TTR:   76.4 % (4.5 y)   Next INR check:   6/21/2019   INR from last check:   2.00 (5/24/2019)   Weekly max warfarin dose:      Target end date:      INR check location:      Preferred lab:      Send INR reminders to:   ANTICOAGULATION POOL C (DTN,VAD,CGR,GAV)    Indications    A-fib (H) (Resolved) [I48.91]           Comments:            Anticoagulation Care Providers     Provider Role Specialty Phone number    Natasha Nunez MD Referring Family Medicine [phone]

## 2021-05-29 NOTE — TELEPHONE ENCOUNTER
"Prolia Injection Phone Screen      Screening questions have been asked 2-3 days prior to administration visit for Prolia. If any questions are answered with \"Yes,\" this phone encounter were will routed to ordering provider for further evaluation.     1.  When was the last injection? 12/10/2019    2.  Has insurance for this injection been verified?  Yes    3.  Did you experience any new onset achiness or rashes that lasted for over a month with your previous Prolia injection?   No    4.  Do you have a fever over 101?F or a new deep cough that is unusual for you today? No    5.  Have you started any new medications in the last 6 months that you were told could affect your immune system? These may have been prescribed by oncologist, transplant, rheumatology, or dermatology.   No    6.  In the last 6 months have you have gastric bypass or parathyroid surgery?   No    7.  Do you plan dental work requiring drilling into the bone such as implants/extractions or oral surgery in the next 2-3 months?   No    8. Do you have new insurance since the last injection? Yes.     Patient informed if symptoms discussed above present prior to their administration appointment, they are to notify clinic immediately.   Renay Zendejas CMA  8:14 AM  6/11/2019            "

## 2021-05-29 NOTE — PROGRESS NOTES
"ASSESSMENT:  1. Kidney function abnormal     2. Chronic diastolic congestive heart failure (H)         PLAN:  Continue medication regimen, continue focus on hydration. Labs much improved to baseline, we'll continue to monitor. Likely based on chart review slight decline started after starting furosemide after previous hospitalization. Has been stable and patient dehydrated  onrecent check.  No problem-specific Assessment & Plan notes found for this encounter.      There are no Patient Instructions on file for this visit.    No orders of the defined types were placed in this encounter.    There are no discontinued medications.    No follow-ups on file.    CHIEF COMPLAINT:  Chief Complaint   Patient presents with     Lab Result Follow Up       HISTORY OF PRESENT ILLNESS:  Liz is a 89 y.o. female here today for follow up on lab work. Patient had a slight decline in kidney function which resolved to baseline on recheck and is here today to discuss. Patient with history of CHF and on lasix the past couple years. Change in kidney function occurred since this medication. Typically stable and discussed with patient that likely low previously due to being dehydrated. Labs improved with better hydration. No symptoms of changes to urination, and no new medications.     REVIEW OF SYSTEMS:        All other systems are negative  PFSH:  Reviewed, no changes      TOBACCO USE:  Social History     Tobacco Use   Smoking Status Former Smoker     Packs/day: 0.25     Years: 50.00     Pack years: 12.50     Last attempt to quit: 1998     Years since quittin.3   Smokeless Tobacco Never Used       VITALS:  Vitals:    19 1356   BP: (!) 146/92   Patient Site: Left Arm   Patient Position: Sitting   Cuff Size: Adult Regular   Pulse: (!) 116   Temp: 97.1  F (36.2  C)   TempSrc: Oral   SpO2: 97%   Weight: 105 lb (47.6 kg)   Height: 4' 9\" (1.448 m)     Wt Readings from Last 3 Encounters:   19 105 lb (47.6 kg)   19 " "102 lb 6 oz (46.4 kg)   01/08/19 104 lb (47.2 kg)       PHYSICAL EXAM:   BP (!) 146/92 (Patient Site: Left Arm, Patient Position: Sitting, Cuff Size: Adult Regular)   Pulse (!) 116   Temp 97.1  F (36.2  C) (Oral)   Ht 4' 9\" (1.448 m)   Wt 105 lb (47.6 kg)   SpO2 97%   BMI 22.72 kg/m    General appearance: alert, appears stated age and cooperative  Lungs: clear to auscultation bilaterally  Heart: regular rate and rhythm, S1, S2 normal, no murmur, click, rub or gallop  Extremities: extremities normal, atraumatic, no cyanosis or edema  Pulses: 2+ and symmetric  Neurologic: Grossly normal    DATA REVIEWED:  Additional History from Old Records Summarized (2): previous labs, cardiology visits, previous hosptiatliztiaon.   Decision to Obtain Records (1): 0  Radiology Tests Summarized or Ordered (1): 0  Labs Reviewed or Ordered (1): 1  Medicine Test Summarized or Ordered (1): 0  Independent Review of EKG or X-RAY(2 each): 0    The visit lasted a total of 25 minutes face to face with the patient. Over 50% of the time was spent counseling and educating the patient about plan fo care.    MEDICATIONS:  Current Outpatient Medications   Medication Sig Dispense Refill     acetaminophen (TYLENOL) 325 MG tablet Take 650 mg by mouth as needed.        ANTIOX#10/OM3/DHA/EPA/LUT/ZEAX (I-CAPS ORAL) Take 1 tablet by mouth 2 (two) times a day.       CALCIUM CARBONATE/VITAMIN D3 (CALTRATE 600 + D ORAL) Take 1 tablet by mouth daily.       carvedilol (COREG) 25 MG tablet TAKE ONE TABLET BY MOUTH TWICE A DAY WITH MEALS 180 tablet 2     cholecalciferol, vitamin D3, (CHOLECALCIFEROL) 1,000 unit tablet Take 1,000 Units by mouth bedtime.        denosumab (PROLIA) 60 mg/mL Syrg Inject 60 mg under the skin every 6 (six) months. Outpatient Injection only.  Due around July 2014       diltiazem (CARDIZEM CD) 120 MG 24 hr capsule Take 2 capsules (240 mg total) by mouth daily. 180 capsule 3     FOLIC ACID/MV,FE,OTHER MIN (CENTRUM ORAL) Take 1 " tablet by mouth daily.       furosemide (LASIX) 40 MG tablet TAKE ONE TABLET BY MOUTH TWICE A DAY AT 9 A.M. AND 6 P.M. 180 tablet 1     glucosamine-chondroitin 500-400 mg tablet Take 1 tablet by mouth 2 (two) times a day.       latanoprost (XALATAN) 0.005 % ophthalmic solution Administer 1 drop to both eyes bedtime.       lisinopril (PRINIVIL,ZESTRIL) 20 MG tablet Take 1 tablet (20 mg total) by mouth 2 (two) times a day. 180 tablet 3     nitroglycerin (NITROSTAT) 0.4 MG SL tablet Place 1 tablet (0.4 mg total) under the tongue every 5 (five) minutes as needed for chest pain (pt has never used). 25 tablet 11     timolol (BETIMOL) 0.5 % ophthalmic solution Administer 1 drop into the left eye daily.       warfarin (COUMADIN/JANTOVEN) 4 MG tablet TAKE 0.5-1 TABLETS (2-4 MG TOTAL) BY MOUTH DAILY. ADJUST DOSE PER INR RESULTS AS INSTRUCTED. 60 tablet 0     Current Facility-Administered Medications   Medication Dose Route Frequency Provider Last Rate Last Dose     denosumab 60 mg (PROLIA 60 mg/ml)  60 mg Subcutaneous Q6 Months Hank uAstin MD   60 mg at 12/10/18 1017       This note has been dictated using voice recognition software. Any grammatical or context distortions are unintentional and inherent to the software

## 2021-05-29 NOTE — TELEPHONE ENCOUNTER
ANTICOAGULATION  MANAGEMENT    Assessment     Today's INR result of 2.3 is Therapeutic (goal INR of 2.0-3.0)        Warfarin taken as previously instructed    No new diet changes affecting INR    No new medication/supplements affecting INR    Continues to tolerate warfarin with no reported s/s of bleeding or thromboembolism     Previous INR was Therapeutic    Plan:     Left a detailed message for Liz regarding INR result and instructed:     Warfarin Dosing Instructions:  Continue current warfarin dose 2 mg daily on mon; and 4 mg daily rest of week  (0 % change)    Instructed patient to follow up no later than: one month      Instructed to call the [facility] for any changes, questions or concerns. (#[phone])   ?   Candelaria Contreras RN    Subjective/Objective:      Liz Gonzalez, a 89 y.o. female is on warfarin.     Liz reports:     Home warfarin dose: as updated on anticoagulation calendar per template     Missed doses: No     Medication changes:  No medrol injection 5/23 hip     S/S of bleeding or thromboembolism:  No     New Injury or illness:  No     Changes in diet or alcohol consumption:  No     Upcoming surgery, procedure or cardioversion:  No    Anticoagulation Episode Summary     Current INR goal:   2.0-3.0   TTR:   76.7 % (4.5 y)   Next INR check:   7/9/2019   INR from last check:   2.30 (6/11/2019)   Weekly max warfarin dose:      Target end date:      INR check location:      Preferred lab:      Send INR reminders to:   [facility]    Indications    A-fib (H) (Resolved) [I48.91]           Comments:            Anticoagulation Care Providers     Provider Role Specialty Phone number    Natasha Nunez MD Referring Family Medicine [phone]

## 2021-05-29 NOTE — PROGRESS NOTES
The following steps were completed to comply with the REMS program for Prolia:  1. Ordering provider has previously reviewed information in the Medication Guide and Patient Counseling Chart, including the serious risks of Prolia  and the symptoms of each risk and have been advised to seek prompt medical attention if they have signs or symptoms of any of the serious risks.  2. Provided each patient a copy of the Medication Guide and Patient Brochure.  See MAR for administration details.   Indication: Prolia  (denosumab) is a prescription medicine used to treat osteoporosis in patients who:   Are at high risk for fracture, meaning patients who have had a fracture related to osteoporosis, or who have multiple risk factors for fracture; Cannot use another osteoporosis medicine or other osteoporosis medicines did not work well.   The timeline for early/late injections would be 4 weeks early and any time after the 6 month kavita. If a patient receives their injection late, then the subsequent injection would be 6 months from the date that they actually received the injection    Have the screening questions been asked prior to this administration? Yes     Renay Zendejas CMA  10:00 AM  6/11/2019

## 2021-05-29 NOTE — PROGRESS NOTES
[facility] Daily Progress     Patient Name: Liz Gonzalez  Date: 6/10/2019  Visit #: 2  PTA visit #:  -  Referral Diagnosis:  Trochanteric bursitis of left hip [M70.62]  - Primary       Myofascial pain [M79.18]       Spondylolisthesis of lumbar region [M43.16]       Foraminal stenosis of lumbar region [M99.83]       Other idiopathic scoliosis, unspecified spinal region [M41.20]       Referring provider: Jasmeet Pardo DO  Visit Diagnosis:     ICD-10-CM    1. Greater trochanteric bursitis of both hips M70.61     M70.62    2. Chronic bilateral low back pain without sciatica M54.5     G89.29    3. Weakness of both hips R29.898    4. Spondylolisthesis of lumbar region M43.16      Precautions / Restrictions  osteoporosis, h/o cancer, CHF, a-fib     From Initial Evaluation:  Liz Gonzalez is a 89 y.o. female who presents to therapy today with chief complaints of chronic L>R hip and low back pain. Order date 5/23/19. The patient is having difficulty with walking >1/3 of a mile, lifting, changing positions, and doing work around the house due to pain and weakness. With examination, the patient demonstrates bilateral hip weakness, tenderness over greater trochanter L>R, and trendelenburg gait pattern. The patient would also benefit from balance testing and exercises. The patient will benefit from skilled PT to improve her strength, pain, gait, balance, and overall function.     Assessment:     HEP/POC compliance is  good .     Patient demonstrates good understanding of HEP. She tolerates this well without increased pain in session today. Her 30 second sit to stand is at the edge of falls risk, but is well above her age matched norm. The patient was given further hip and LE strengthening, as well as balance exercises today to help address hip pain, weakness, and balance impairments. She would benefit from further PT per POC.    Goal Status:  Pt. will be independent with home exercise program in : 4  weeks  Pt. will report decreased intensity, frequency of : Pain;in 4 weeks    Patient will decrease : GUMARO score;by _ points;for improved quality of function;in 6 weeks  by ___ points: 30%  Pt will: be able walk with less AD use and less pain, goal of 2/3 mile in 6 weeks:  Pt will: report going to her exercise class at her living facility, for improved activity level, in 4 weeks:      Plan / Patient Education:     Continue with initial plan of care.     Plan for next visit: Nustep warmup, Review HEP,  sit to stand picture, give as exercise, trial other balance exercises for HEP.     Subjective:     Pain Rating: Not Much Right Now    After she lays on the bed and stretches the hip upwards, she can feel it for while. She is still doing the walking around the building.    Objective:     30 second sit to stand: 12X (age gender norm 7X)    Unable to do tandem stance > 3-5 seconds.    Exercises:  Exercise #1: SKTC 2 X 15-30 seconds  Comment #1: Hip Adduction squeezed 5 seconds X 10  Exercise #2: Hip Abductions/ER seated X 10 yellow band  Comment #2: sit to stands X 10   Exercise #3: Modified Tandem Stance X 15-30 seconds  Comment #3: standing hip exercises X 10   Exercise #4: Heel/Toe Raises X 20  Comment #4: Nustep level 3 X 4.5 min    Treatment Today     TREATMENT MINUTES COMMENTS   Evaluation     Self-care/ Home management     Manual therapy     Neuromuscular Re-education 10 Balance testing and exercises.    Therapeutic Activity     Therapeutic Exercises 21 HEP review and progression   Gait training     Modality__________________                Total 31    Blank areas are intentional and mean the treatment did not include these items.       Ron GARCIA  6/10/2019

## 2021-05-29 NOTE — TELEPHONE ENCOUNTER
Anticoagulation Annual Referral Renewal Review    Liz Gonzalez's chart reviewed for annual renewal of referral to anticoagulation monitoring.        Criteria for anticoagulation nurse and/or pharmacist renewal met   Warfarin indication: Atrial Fibrillation Yes, per indication   Current with INR monitoring/compliant Yes Yes   Date of last office visit 5/16/19 Yes, had office visit within last year   Time in Therapeutic Range (TTR) 87.84 % Yes, TTR > 60%       Liz Gonzalez met all criteria for anticoagulation management program initiated renewal.  New INR standing orders and anticoagulation referral renewal placed.      Gayla Ayon RN  1:54 PM

## 2021-05-30 VITALS — WEIGHT: 101.6 LBS | BODY MASS INDEX: 21.23 KG/M2

## 2021-05-30 VITALS — BODY MASS INDEX: 22.46 KG/M2 | WEIGHT: 107 LBS | HEIGHT: 58 IN

## 2021-05-30 VITALS — BODY MASS INDEX: 22.8 KG/M2 | HEIGHT: 58 IN | WEIGHT: 108.6 LBS

## 2021-05-30 VITALS — BODY MASS INDEX: 21.62 KG/M2 | HEIGHT: 58 IN | WEIGHT: 103 LBS

## 2021-05-30 VITALS — WEIGHT: 110.2 LBS | BODY MASS INDEX: 23.03 KG/M2

## 2021-05-30 NOTE — TELEPHONE ENCOUNTER
"ANTICOAGULATION  MANAGEMENT    Liz Gonzalez is on warfarin and had a point of care INR result > 5.5 or an \"error message\" on point of care meter today.    Afternoon INR; STAT venous INR processing and STAT  requested from lab    Spoke with Liz via phone while at the lab and reviewed triage questions for potential signs and symptoms of bleeding.      Patient Response     Have you had any bleeding in the last week?   No   Have you passed any red, black, or tarry stools in last week?   No   Have you vomited/spit up any red or coffee ground material in last week?   No   Have you had any new, severe abdominal pain or bloating develop in last week?   No   Have you fallen or had any injuries in last week?   No   Do you currently have a severe, sudden onset headache?   No   Do you currently have any severe sudden changes in your vision?   No   Do you currently have any new onset numbness, weakness/paralysis?   No     Assessment/Plan:     Liz's responses were negative for signs and symptoms of bleeding; may discharge from clinic    Liz instructed to:       Hold warfarin today until venous INR result returns and receives follow up call from M    Seek medical attention for new signs and symptoms of bleeding or a fall with injury.         "

## 2021-05-30 NOTE — TELEPHONE ENCOUNTER
Referral Request  Type of referral: ENT  Who s requesting: The patient's daughter-in-law  Why the request: The patient's daughter-in-law is concerned that the patient is having difficulty hearing.  Have you been seen for this request: No:  Appointment Offered:  declined  Does patient have a preference on a group/provider? [facility].  Okay to leave a detailed message?  Yes

## 2021-05-30 NOTE — TELEPHONE ENCOUNTER
Called and spoke to patient regarding her questions about the referrals. I stated I have sent the pended referral to [facility] to review. When/if she signs it our specialty  can assist her with scheduling.

## 2021-05-30 NOTE — TELEPHONE ENCOUNTER
ANTICOAGULATION  MANAGEMENT    Assessment     Today's INR result of 3,3 is Supratherapeutic (goal INR of 2.0-3.0)        Warfarin recently held as instructed which may be affecting INR    Liz states she added back greens to her diet (had stopped eating due to diarrhea)    No new medication/supplements affecting INR    Continues to tolerate warfarin with no reported s/s of bleeding or thromboembolism     Previous INR was Supratherapeutic    Plan:     Spoke with Liz regarding INR result and instructed:     Warfarin Dosing Instructions:  one time dose of 2mg tonight then continue current warfarin dose 2 mg daily on Mon; and 4 mg daily rest of week  (0 % change)    Instructed patient to follow up no later than: 4-5 days    Education provided: importance of consistent vitamin K intake, impact of vitamin K foods on INR, importance of therapeutic range and target INR goal and significance of current INR result    Liz verbalizes understanding and agrees to warfarin dosing plan.    Instructed to call the [facility] for any changes, questions or concerns. (#[phone])   ?   Gayla Ayon RN    Subjective/Objective:      Liz Gonzalez, a 89 y.o. female is on warfarin.     Liz reports:     Home warfarin dose: as updated on anticoagulation calendar per template     Missed doses: Yes: held two doses as instructed     Medication changes:  No     S/S of bleeding or thromboembolism:  No     New Injury or illness:  No     Changes in diet or alcohol consumption:  No     Upcoming surgery, procedure or cardioversion:  No    Anticoagulation Episode Summary     Current INR goal:   2.0-3.0   TTR:   75.2 % (4.6 y)   Next INR check:   7/24/2019   INR from last check:   3.30! (7/19/2019)   Weekly max warfarin dose:      Target end date:      INR check location:      Preferred lab:      Send INR reminders to:   [facility]    Indications    A-fib (H) (Resolved) [I48.91]           Comments:             Anticoagulation Care Providers     Provider Role Specialty Phone number    Natasha Nunez MD Referring Family Medicine [phone]

## 2021-05-30 NOTE — TELEPHONE ENCOUNTER
Agree with ACM management. She should follow their instructions and get INR checked on 7/19. If any bleeding occurs she needs to be seen urgently.

## 2021-05-30 NOTE — TELEPHONE ENCOUNTER
ANTICOAGULATION  MANAGEMENT    Assessment     Today's INR result of 7.26 is Supratherapeutic (goal INR of 2.0-3.0)        Warfarin taken as previously instructed    Decreased greens/vitamin K intake may be affecting INR . Not eating greens due to diarrhea for the last 2-3 weeks.    No new medication/supplements affecting INR    Continues to tolerate warfarin with no reported s/s of bleeding or thromboembolism     Previous INR was Supratherapeutic    Plan:     Spoke with Liz regarding INR result and instructed:     Warfarin Dosing Instructions: hold warfarin today and tomorrow    Instructed patient to follow up no later than: 7/19    Education provided: impact of vitamin K foods on INR, importance of therapeutic range, target INR goal and significance of current INR result, monitoring for bleeding signs and symptoms and when to seek medical attention/emergency care    Liz verbalizes understanding and agrees to warfarin dosing plan.    Instructed to call the [facility] for any changes, questions or concerns. (#[phone])   ?   Gayla Ayon RN    Subjective/Objective:      Liz Gonzalez, a 89 y.o. female is on warfarin.     Liz reports:     Home warfarin dose: as updated on anticoagulation calendar per template     Missed doses: No     Medication changes:  No     S/S of bleeding or thromboembolism:  No     New Injury or illness:  No but continues to have diarrhea (PCP aware)     Changes in diet or alcohol consumption:  Yes: not eating greens due to diarrhea     Upcoming surgery, procedure or cardioversion:  No    Anticoagulation Episode Summary     Current INR goal:   2.0-3.0   TTR:   75.3 % (4.6 y)   Next INR check:   7/19/2019   INR from last check:   7.26! (7/17/2019)   Weekly max warfarin dose:      Target end date:      INR check location:      Preferred lab:      Send INR reminders to:   [facility]    Indications    A-fib (H) (Resolved) [I48.91]           Comments:             Anticoagulation Care Providers     Provider Role Specialty Phone number    Natasha Nunez MD Referring Family Medicine [phone]

## 2021-05-30 NOTE — TELEPHONE ENCOUNTER
Left message to call back for: Results   Information to relay to patient:  LMTCB, please relay results and recommendations.

## 2021-05-30 NOTE — TELEPHONE ENCOUNTER
ANTICOAGULATION  MANAGEMENT    Assessment     Today's INR result of 1.9 is Subtherapeutic (goal INR of 2.0-3.0)        Warfarin recently held as instructed which may be affecting INR (held 7/17-18)    Increased greens/vitamin K intake may be affecting INR     Still having symptom of diarrhea    No new medication/supplements affecting INR    Continues to tolerate warfarin with no reported s/s of bleeding or thromboembolism     Previous INR was Supratherapeutic    Plan:     Spoke with Liz regarding INR result and instructed:     Warfarin Dosing Instructions:  Continue current warfarin dose 2 mg daily on Mon; and 4 mg daily rest of week  (0 % change)    Instructed patient to follow up no later than: one week    Education provided: importance of consistent vitamin K intake, impact of vitamin K foods on INR, importance of therapeutic range and target INR goal and significance of current INR result  Liz wants to continue eating more greens  Liz verbalizes understanding and agrees to warfarin dosing plan.    Instructed to call the [facility] for any changes, questions or concerns. (#[phone])   ?   Gayla Ayon RN    Subjective/Objective:      Liz Gonzalez, a 89 y.o. female is on warfarin.     Liz reports:     Home warfarin dose: template incorrect; verbally confirmed home dose with Liz and updated on anticoagulation calendar     Missed doses: Yes: two held doses     Medication changes:  No     S/S of bleeding or thromboembolism:  No     New Injury or illness:  No     Changes in diet or alcohol consumption:  Yes had more greens in the last week     Upcoming surgery, procedure or cardioversion:  No    Anticoagulation Episode Summary     Current INR goal:   2.0-3.0   TTR:   75.2 % (4.6 y)   Next INR check:   7/31/2019   INR from last check:   1.90! (7/24/2019)   Weekly max warfarin dose:      Target end date:      INR check location:      Preferred lab:      Send INR reminders to:    [facility]    Indications    A-fib (H) (Resolved) [I48.91]           Comments:            Anticoagulation Care Providers     Provider Role Specialty Phone number    Natasha Nunez MD Referring Family Medicine [phone]

## 2021-05-30 NOTE — TELEPHONE ENCOUNTER
----- Message from SHUN Stevens sent at 7/5/2019  8:04 AM CDT -----  Labs stable with previous, TSH looks a bit hyperthyroid but hormone levels normal. If diarrhea persists without other findings to support can look further into thyroid function. For now will await stool study results.

## 2021-05-30 NOTE — PROGRESS NOTES
[facility] Daily Progress     Patient Name: Liz Gonzalez  Date: 7/17/2019  Visit #: 4  PTA visit #:  -  Referral Diagnosis:  Trochanteric bursitis of left hip [M70.62]  - Primary       Myofascial pain [M79.18]       Spondylolisthesis of lumbar region [M43.16]       Foraminal stenosis of lumbar region [M99.83]       Other idiopathic scoliosis, unspecified spinal region [M41.20]       Referring provider: Jasmeet Pardo DO  Visit Diagnosis:     ICD-10-CM    1. Greater trochanteric bursitis of both hips M70.61     M70.62    2. Chronic bilateral low back pain without sciatica M54.5     G89.29    3. Weakness of both hips R29.898    4. Spondylolisthesis of lumbar region M43.16      Precautions / Restrictions  osteoporosis, h/o cancer, CHF, a-fib     From Initial Evaluation:  Liz Gonzalez is a 89 y.o. female who presents to therapy today with chief complaints of chronic L>R hip and low back pain. Order date 5/23/19. The patient is having difficulty with walking >1/3 of a mile, lifting, changing positions, and doing work around the house due to pain and weakness. With examination, the patient demonstrates bilateral hip weakness, tenderness over greater trochanter L>R, and trendelenburg gait pattern. The patient would also benefit from balance testing and exercises. The patient will benefit from skilled PT to improve her strength, pain, gait, balance, and overall function.     Assessment:     HEP/POC compliance is  good .     Patient reports that she has been ill recently. She is weaker as well as having decreased endurance due to this. Is following with primary. Today, she did do better with her balance exercises and did tolerate a full session of PT, despite fatigue level. She did respond well to MT today to the lateral left leg and hip. She will benefit from further PT to improve her hip pain, weakness, and balance impairments, as well as overall reduced endurance from being ill. She would  benefit from further PT per POC.    Goal Status:  Pt. will be independent with home exercise program in : 4 weeks  Pt. will report decreased intensity, frequency of : Pain;in 4 weeks    Patient will decrease : GUMARO score;by _ points;for improved quality of function;in 6 weeks  by ___ points: 30%  Pt will: be able walk with less AD use and less pain, goal of 2/3 mile in 6 weeks:  Pt will: report going to her exercise class at her living facility, for improved activity level, in 4 weeks:      Plan / Patient Education:     Continue with initial plan of care.     Plan for next visit: Nustep warmup, Review HEP,  sit to stand picture, give as exercise, trial other balance exercises for HEP.     Subjective:     Pain Rating: Not Much Right Now    She is still dealing with the same stomach issue. Still f/u with primary. Some decrease in endurance because of this. Hasn't been able to do the exercises as much. The balancing is really hard, has been using her walker.    Objective:     Able to perform modified tandem today.     Discussed continued walking to maintain endurance and activity level.     Decreased hip pain following STM to lateral hip, glut, and ITB.    Patient walking with 4WW today.    Exercises:  Exercise #1: SKTC 2 X 15-30 seconds  Comment #1: Hip Adduction squeezed 5 seconds X 10  Exercise #2: Hip Abductions/ER seated X 10 yellow band  Comment #2: sit to stands X 10   Exercise #3: Modified Tandem Stance X 15-30 seconds  Comment #3: standing hip exercises X 10   Exercise #4: Heel/Toe Raises X 20  Comment #4: Nustep level 3 X 4.5 min    Treatment Today     TREATMENT MINUTES COMMENTS   Evaluation     Self-care/ Home management     Manual therapy 10 STM to lateral hip, ITB, glut on the left   Neuromuscular Re-education 5 Balance review/exercise modification.   Therapeutic Activity     Therapeutic Exercises 12 HEP review and progression   Gait training     Modality__________________                Total 27    Blank  areas are intentional and mean the treatment did not include these items.       Ron GARCIA  7/17/2019

## 2021-05-30 NOTE — PROGRESS NOTES
[facility] Daily Progress     Patient Name: Liz Gonzalez  Date: 7/8/2019  Visit #: 3  PTA visit #:  -  Referral Diagnosis:  Trochanteric bursitis of left hip [M70.62]  - Primary       Myofascial pain [M79.18]       Spondylolisthesis of lumbar region [M43.16]       Foraminal stenosis of lumbar region [M99.83]       Other idiopathic scoliosis, unspecified spinal region [M41.20]       Referring provider: Jasmeet Pardo DO  Visit Diagnosis:     ICD-10-CM    1. Greater trochanteric bursitis of both hips M70.61     M70.62    2. Chronic bilateral low back pain without sciatica M54.5     G89.29    3. Weakness of both hips R29.898    4. Spondylolisthesis of lumbar region M43.16      Precautions / Restrictions  osteoporosis, h/o cancer, CHF, a-fib     From Initial Evaluation:  Liz Gonzalez is a 89 y.o. female who presents to therapy today with chief complaints of chronic L>R hip and low back pain. Order date 5/23/19. The patient is having difficulty with walking >1/3 of a mile, lifting, changing positions, and doing work around the house due to pain and weakness. With examination, the patient demonstrates bilateral hip weakness, tenderness over greater trochanter L>R, and trendelenburg gait pattern. The patient would also benefit from balance testing and exercises. The patient will benefit from skilled PT to improve her strength, pain, gait, balance, and overall function.     Assessment:     HEP/POC compliance is  good .     Patient reports that she has been ill recently. She is weaker as well as having decreased endurance due to this. Is following with primary. Today, the patient demonstrates worse balance then previously. We did keep her session short today due to being ill and not wanting to overwork/fatigue her too much. She will benefit from further PT to improve her hip pain, weakness, and balance impairments, as well as overall reduced endurance from being ill. She would benefit from further  PT per POC.    Goal Status:  Pt. will be independent with home exercise program in : 4 weeks  Pt. will report decreased intensity, frequency of : Pain;in 4 weeks    Patient will decrease : GUMARO score;by _ points;for improved quality of function;in 6 weeks  by ___ points: 30%  Pt will: be able walk with less AD use and less pain, goal of 2/3 mile in 6 weeks:  Pt will: report going to her exercise class at her living facility, for improved activity level, in 4 weeks:      Plan / Patient Education:     Continue with initial plan of care.     Plan for next visit: Nustep warmup, Review HEP,  sit to stand picture, give as exercise, trial other balance exercises for HEP.     Subjective:     Pain Rating: Not Much Right Now      Has been ill the last couple of weeks with a stomach issue, still some now, has been f/u with her primary about this. Reports she has lost some weight.    Objective:     Unable to modified tandem stance today.   Some difficulty with NBOS today (Previously okay with modified tandem.    Patient walking with 4WW today.    Kept session short today due to being ill.    Exercises:  Exercise #1: SKTC 2 X 15-30 seconds  Comment #1: Hip Adduction squeezed 5 seconds X 10  Exercise #2: Hip Abductions/ER seated X 10 yellow band  Comment #2: sit to stands X 10   Exercise #3: Modified Tandem Stance X 15-30 seconds  Comment #3: standing hip exercises X 10   Exercise #4: Heel/Toe Raises X 20  Comment #4: Nustep level 3 X 4.5 min    Treatment Today     TREATMENT MINUTES COMMENTS   Evaluation     Self-care/ Home management     Manual therapy     Neuromuscular Re-education 7 Balance review/exercise modification.   Therapeutic Activity     Therapeutic Exercises 10 HEP review and progression   Gait training     Modality__________________                Total 17    Blank areas are intentional and mean the treatment did not include these items.       Ron GARCIA  7/8/2019

## 2021-05-30 NOTE — TELEPHONE ENCOUNTER
INITIAL INR > 5.5 NOTIFICATION- Anticoagulation Management Program    Liz Gonzalez had an initial point of care INR of >8       Venous confirmation of INR required per protocol. STAT venous sample drawn and being sent out for confirmation.    Anticoagulation template scanned into EHR. Patient phone call with Anticoagulation Management Program (ACM) being arranged for patient triage prior to discharge.     Darron Meneses RT (R)

## 2021-05-30 NOTE — TELEPHONE ENCOUNTER
Patient's venous INR draw came back at 7.26  Possibly due to symptom of diarrhea and not eating greens.    Does PCP agree with plan for patient to hold her warfarin today and tomorrow and recheck INR on 7/19?

## 2021-05-30 NOTE — PROGRESS NOTES
ASSESSMENT:  1. Diarrhea, unspecified type           PLAN:  Ongoing for about 4 to 5 days.  Patient has lost little weight so stressed the importance of eating and drinking adequately.  No signs that she should have fluids yet today.  We will try to drink Pedialyte and water and continue to monitor symptoms.  Discussed likely viral in nature and should improve over the next couple of days.  If not improving by that time they should let us know.  Would consider stool studies if persistent.Can come in for INR check next week or check with anticoagulation management.   No problem-specific Assessment & Plan notes found for this encounter.    The following low BMI interventions were performed this visit: weight gain advised  There are no Patient Instructions on file for this visit.    No orders of the defined types were placed in this encounter.    There are no discontinued medications.    No follow-ups on file.    CHIEF COMPLAINT:  Chief Complaint   Patient presents with     Diarrhea     x 4 days, no other symptoms, no other changes       HISTORY OF PRESENT ILLNESS:  Liz is a 89 y.o. female here today for 4 days approximately of diarrhea.  Otherwise patient states she is feeling fine.  No fevers no abdominal pain no other symptoms.  She has had a couple of episodes of day but has not gotten some depends that she is going to wear.  Has not been eating and drinking adequately because of diarrhea.  Her weight is down today by a few pounds.  Discussed that this is her most important focus today since she is somewhat feel frail already we need to make sure she is eating and drinking adequately and staying hydrated.  As of right now she feels fine, she is voiding adequately.  No signs of urinary tract infection.  States she is having approximately 4 diarrhea stools a day.  There has been some diarrheal illness going around her assisted living.  No recent travel no known history of diverticulosis.  Wondering if she should  "recheck INR sooner than 1 month out?    REVIEW OF SYSTEMS:        All other systems are negative  PFSH:  Reviewed, no changes      TOBACCO USE:  Social History     Tobacco Use   Smoking Status Former Smoker     Packs/day: 0.25     Years: 50.00     Pack years: 12.50     Last attempt to quit: 1998     Years since quittin.4   Smokeless Tobacco Never Used       VITALS:  Vitals:    19 1405   BP: 123/75   Patient Site: Left Arm   Patient Position: Sitting   Cuff Size: Adult Regular   Pulse: 65   Temp: (!) 95.9  F (35.5  C)   TempSrc: Oral   SpO2: 99%   Weight: (!) 98 lb 12.8 oz (44.8 kg)   Height: 4' 9\" (1.448 m)     Wt Readings from Last 3 Encounters:   19 (!) 98 lb 12.8 oz (44.8 kg)   19 103 lb (46.7 kg)   19 105 lb (47.6 kg)       PHYSICAL EXAM:   /75 (Patient Site: Left Arm, Patient Position: Sitting, Cuff Size: Adult Regular)   Pulse 65   Temp (!) 95.9  F (35.5  C) (Oral)   Ht 4' 9\" (1.448 m)   Wt (!) 98 lb 12.8 oz (44.8 kg)   SpO2 99%   BMI 21.38 kg/m    General appearance: alert, appears stated age and cooperative  Lungs: clear to auscultation bilaterally  Heart: regular rate and rhythm, S1, S2 normal, no murmur, click, rub or gallop  Abdomen: soft, non-tender; bowel sounds normal; no masses,  no organomegaly  Neurologic: Grossly normal  Psychologic: Mood and affect normal.      DATA REVIEWED:  Additional History from Old Records Summarized (2): 0  Decision to Obtain Records (1): 0  Radiology Tests Summarized or Ordered (1): 0  Labs Reviewed or Ordered (1): 0  Medicine Test Summarized or Ordered (1): 0  Independent Review of EKG or X-RAY(2 each): 0    The visit lasted a total of 20 minutes face to face with the patient. Over 50% of the time was spent counseling and educating the patient about plan of care.    MEDICATIONS:  Current Outpatient Medications   Medication Sig Dispense Refill     acetaminophen (TYLENOL) 325 MG tablet Take 650 mg by mouth as needed.        " ANTIOX#10/OM3/DHA/EPA/LUT/ZEAX (I-CAPS ORAL) Take 1 tablet by mouth 2 (two) times a day.       CALCIUM CARBONATE/VITAMIN D3 (CALTRATE 600 + D ORAL) Take 1 tablet by mouth daily.       carvedilol (COREG) 25 MG tablet TAKE ONE TABLET BY MOUTH TWICE A DAY WITH MEALS 180 tablet 2     cholecalciferol, vitamin D3, (CHOLECALCIFEROL) 1,000 unit tablet Take 1,000 Units by mouth bedtime.        denosumab (PROLIA) 60 mg/mL Syrg Inject 60 mg under the skin every 6 (six) months. Outpatient Injection only.  Due around July 2014       diltiazem (CARDIZEM CD) 120 MG 24 hr capsule Take 2 capsules (240 mg total) by mouth daily. 180 capsule 3     FOLIC ACID/MV,FE,OTHER MIN (CENTRUM ORAL) Take 1 tablet by mouth daily.       furosemide (LASIX) 40 MG tablet TAKE ONE TABLET BY MOUTH TWICE A DAY AT 9 A.M. AND 6 P.M. 180 tablet 1     glucosamine-chondroitin 500-400 mg tablet Take 1 tablet by mouth 2 (two) times a day.       latanoprost (XALATAN) 0.005 % ophthalmic solution Administer 1 drop to both eyes bedtime.       lisinopril (PRINIVIL,ZESTRIL) 20 MG tablet Take 1 tablet (20 mg total) by mouth 2 (two) times a day. 180 tablet 3     nitroglycerin (NITROSTAT) 0.4 MG SL tablet Place 1 tablet (0.4 mg total) under the tongue every 5 (five) minutes as needed for chest pain (pt has never used). 25 tablet 11     timolol (BETIMOL) 0.5 % ophthalmic solution Administer 1 drop into the left eye daily.       timolol maleate (TIMOPTIC) 0.5 % ophthalmic solution USE 1 DROP(S) IN LEFT EYE ONCE A DAY. 90 DAY SUPPLY  3     warfarin (COUMADIN/JANTOVEN) 4 MG tablet TAKE 0.5-1 TABLETS (2-4 MG TOTAL) BY MOUTH DAILY. ADJUST DOSE PER INR RESULTS AS INSTRUCTED. 60 tablet 0     Current Facility-Administered Medications   Medication Dose Route Frequency Provider Last Rate Last Dose     denosumab 60 mg (PROLIA 60 mg/ml)  60 mg Subcutaneous Q6 Months Hank Austin MD   60 mg at 06/11/19 1000       This note has been dictated using voice recognition software. Any  grammatical or context distortions are unintentional and inherent to the software

## 2021-05-30 NOTE — TELEPHONE ENCOUNTER
"FYI - Status Update  Who is Calling: Patient  Update: Please call neal. She has been waiting \"for hours\" for a call back with the results of her bloodwork that was sent to the lab for her.  Okay to leave a detailed message?:  Yes    "

## 2021-05-30 NOTE — TELEPHONE ENCOUNTER
Called and spoke to Mary, pt daughter in law. Informed her of note from [facility] and Dr. Sagastume. DIL understands and agrees with plan.

## 2021-05-30 NOTE — PROGRESS NOTES
[facility] Daily Progress     Patient Name: Liz Gonzalez  Date: 7/24/2019  Visit #: 5  PTA visit #:  -  Referral Diagnosis:  Trochanteric bursitis of left hip [M70.62]  - Primary       Myofascial pain [M79.18]       Spondylolisthesis of lumbar region [M43.16]       Foraminal stenosis of lumbar region [M99.83]       Other idiopathic scoliosis, unspecified spinal region [M41.20]       Referring provider: Jasmeet Pardo DO  Visit Diagnosis:     ICD-10-CM    1. Greater trochanteric bursitis of both hips M70.61     M70.62    2. Chronic bilateral low back pain without sciatica M54.5     G89.29    3. Weakness of both hips R29.898    4. Spondylolisthesis of lumbar region M43.16      Precautions / Restrictions  osteoporosis, h/o cancer, CHF, a-fib     From Initial Evaluation:  Liz Gonzalez is a 89 y.o. female who presents to therapy today with chief complaints of chronic L>R hip and low back pain. Order date 5/23/19. The patient is having difficulty with walking >1/3 of a mile, lifting, changing positions, and doing work around the house due to pain and weakness. With examination, the patient demonstrates bilateral hip weakness, tenderness over greater trochanter L>R, and trendelenburg gait pattern. The patient would also benefit from balance testing and exercises. The patient will benefit from skilled PT to improve her strength, pain, gait, balance, and overall function.     Assessment:     HEP/POC compliance is  good .     Patient reports that she has been ill recently. She is weaker, but does feel like her endurance is improving. Today, she did do better with her balance exercises and did tolerate a full session of PT, despite fatigue level. She is still above her age normal for sit to stand test as well. She does have a well rounded HEP and can access exercise class at her facility which she has done. At this point the patient has met her goals and is appropriate to continue with HEP  independently. Return to PT in future if getting less active, weaker, or balance challenges.     Goal Status:  Pt. will be independent with home exercise program in : 4 weeks Met  Pt. will report decreased intensity, frequency of : Pain;in 4 weeks Met  Patient will decrease : GUMARO score;by _ points;for improved quality of function;in 6 weeks  by ___ points: 30% Not Tested  Pt will: be able walk with less AD use and less pain, goal of 2/3 mile in 6 weeks: Met  Pt will: report going to her exercise class at her living facility, for improved activity level, in 4 weeks: Met      Plan / Patient Education:     Patient to continue independently with HEP and exercise class at housing facility. D/C in 4 weeks.    Subjective:     Pain Rating: Not Much Right Now    As far as balance goes, she is feeling that she needs to hold to stuff for the exercises. She is feeling safe with walking, uses the walker outside of her room. The hip isn't bad. Continues to be active and walking around the building. Still having the stomach issue.      Objective:     30 second sit to stand: 10X (age gender norm 7X)    Tandem stance 5-10 seconds prior to needing UE assist.   Previously only 3-5 seconds.    Discussed continued walking to maintain endurance and activity level.     Patient walking with 4WW today.    Exercises:  Exercise #1: SKTC 2 X 15-30 seconds  Comment #1: Hip Adduction squeezed 5 seconds X 10  Exercise #2: Hip Abductions/ER seated X 10 yellow band  Comment #2: sit to stands X 10   Exercise #3: Modified Tandem Stance X 15-30 seconds  Comment #3: standing hip exercises X 10   Exercise #4: Heel/Toe Raises X 20  Comment #4: Nustep level 3 X 4.5 min    Treatment Today     TREATMENT MINUTES COMMENTS   Evaluation     Self-care/ Home management     Manual therapy Not done today STM to lateral hip, ITB, glut on the left   Neuromuscular Re-education 10 Balance review/exercise modification.   Therapeutic Activity     Therapeutic Exercises 15  HEP review and progression   Gait training     Modality__________________                Total 25    Blank areas are intentional and mean the treatment did not include these items.       Ron GARCIA  7/24/2019

## 2021-05-30 NOTE — PROGRESS NOTES
ASSESSMENT:  1. Diarrhea of presumed infectious origin  Basic Metabolic Panel    Thyroid Cascade    C. difficile Toxigenic by PCR    Culture, Stool    Ova and Parasite, Stool   2. A-fib (H)  INR       PLAN:  Stool studies today, given continued symptoms we will also check lab work and make sure stable electrolytes etc.  Patient feeling generally well and does not look infectious or toxic.  Discussed eating and drinking normally so that she can maintain weight and hydration status.  Will follow for stool studies to be returned.  If not improving and continued symptoms consider CT to rule out colitis or other findings.  No problem-specific Assessment & Plan notes found for this encounter.      There are no Patient Instructions on file for this visit.    Orders Placed This Encounter   Procedures     C. difficile Toxigenic by PCR     Standing Status:   Future     Standing Expiration Date:   7/2/2020     Culture, Stool     Standing Status:   Future     Standing Expiration Date:   7/2/2020     Ova and Parasite, Stool     Standing Status:   Future     Standing Expiration Date:   7/2/2020     Basic Metabolic Panel     Thyroid Rock     There are no discontinued medications.    No follow-ups on file.    CHIEF COMPLAINT:  Chief Complaint   Patient presents with     Diarrhea     10 days 4 diarrhea stools a day, no other symptoms, no other changes       HISTORY OF PRESENT ILLNESS:  Liz is a 89 y.o. female here today for follow-up on diarrhea.  We saw her on 6/27/2019 complaining of 4 diarrhea stools a day and symptoms of continued till now about 7 to 10 days.  Otherwise feeling well no other symptoms or changes no abdominal pain.  No fevers or chills.  Patient had mentioned exposure to other people with loose stools that her living facility no known change to this.  She states that people at her facility have diarrhea all the time but they take Imodium.  She is not sure of any specific exposure.  No travel.  No known  history of diverticulosis.  Continues to be eating well.  Her INR was high today likely due to changes in her diet from last week.  Encourage patient to eat a regular diet now especially if she is feeling fine and continue to work on hydration.  Want to make sure that she does not have any infectious origin to her diarrhea and follow-up further if continued symptoms.    REVIEW OF SYSTEMS:        All other systems are negative  PFSH:  Reviewed, no changes      TOBACCO USE:  Social History     Tobacco Use   Smoking Status Former Smoker     Packs/day: 0.25     Years: 50.00     Pack years: 12.50     Last attempt to quit: 1998     Years since quittin.4   Smokeless Tobacco Never Used       VITALS:  Vitals:    19 1501   BP: 134/68   Patient Site: Left Arm   Patient Position: Sitting   Cuff Size: Adult Regular   Pulse: 83   Resp: 16   Temp: (!) 96.2  F (35.7  C)   TempSrc: Oral   Weight: (!) 99 lb (44.9 kg)     Wt Readings from Last 3 Encounters:   19 (!) 99 lb (44.9 kg)   19 (!) 98 lb 12.8 oz (44.8 kg)   19 103 lb (46.7 kg)       PHYSICAL EXAM:   /68 (Patient Site: Left Arm, Patient Position: Sitting, Cuff Size: Adult Regular)   Pulse 83   Temp (!) 96.2  F (35.7  C) (Oral)   Resp 16   Wt (!) 99 lb (44.9 kg)   BMI 21.42 kg/m    General appearance: alert, appears stated age and cooperative  Lungs: clear to auscultation bilaterally  Heart: regular rate and rhythm, S1, S2 normal, no murmur, click, rub or gallop  Extremities: extremities normal, atraumatic, no cyanosis or edema  Pulses: 2+ and symmetric  Neurologic: Grossly normal        DATA REVIEWED:  Additional History from Old Records Summarized (2): 0  Decision to Obtain Records (1): 0  Radiology Tests Summarized or Ordered (1): 0  Labs Reviewed or Ordered (1): stool studies  Medicine Test Summarized or Ordered (1): 0  Independent Review of EKG or X-RAY(2 each): 0    The visit lasted a total of 25 minutes face to face with the  patient. Over 50% of the time was spent counseling and educating the patient about plan of care.    MEDICATIONS:  Current Outpatient Medications   Medication Sig Dispense Refill     acetaminophen (TYLENOL) 325 MG tablet Take 650 mg by mouth as needed.        ANTIOX#10/OM3/DHA/EPA/LUT/ZEAX (I-CAPS ORAL) Take 1 tablet by mouth 2 (two) times a day.       CALCIUM CARBONATE/VITAMIN D3 (CALTRATE 600 + D ORAL) Take 1 tablet by mouth daily.       carvedilol (COREG) 25 MG tablet TAKE ONE TABLET BY MOUTH TWICE A DAY WITH MEALS 180 tablet 2     cholecalciferol, vitamin D3, (CHOLECALCIFEROL) 1,000 unit tablet Take 1,000 Units by mouth bedtime.        denosumab (PROLIA) 60 mg/mL Syrg Inject 60 mg under the skin every 6 (six) months. Outpatient Injection only.  Due around July 2014       diltiazem (CARDIZEM CD) 120 MG 24 hr capsule Take 2 capsules (240 mg total) by mouth daily. 180 capsule 3     FOLIC ACID/MV,FE,OTHER MIN (CENTRUM ORAL) Take 1 tablet by mouth daily.       furosemide (LASIX) 40 MG tablet TAKE ONE TABLET BY MOUTH TWICE A DAY AT 9 A.M. AND 6 P.M. 180 tablet 1     glucosamine-chondroitin 500-400 mg tablet Take 1 tablet by mouth 2 (two) times a day.       latanoprost (XALATAN) 0.005 % ophthalmic solution Administer 1 drop to both eyes bedtime.       lisinopril (PRINIVIL,ZESTRIL) 20 MG tablet Take 1 tablet (20 mg total) by mouth 2 (two) times a day. 180 tablet 3     nitroglycerin (NITROSTAT) 0.4 MG SL tablet Place 1 tablet (0.4 mg total) under the tongue every 5 (five) minutes as needed for chest pain (pt has never used). 25 tablet 11     timolol (BETIMOL) 0.5 % ophthalmic solution Administer 1 drop into the left eye daily.       timolol maleate (TIMOPTIC) 0.5 % ophthalmic solution USE 1 DROP(S) IN LEFT EYE ONCE A DAY. 90 DAY SUPPLY  3     warfarin (COUMADIN/JANTOVEN) 4 MG tablet TAKE 0.5-1 TABLETS (2-4 MG TOTAL) BY MOUTH DAILY. ADJUST DOSE PER INR RESULTS AS INSTRUCTED. 60 tablet 0     Current Facility-Administered  Medications   Medication Dose Route Frequency Provider Last Rate Last Dose     denosumab 60 mg (PROLIA 60 mg/ml)  60 mg Subcutaneous Q6 Months Hank Austin MD   60 mg at 06/11/19 1000       This note has been dictated using voice recognition software. Any grammatical or context distortions are unintentional and inherent to the software

## 2021-05-30 NOTE — TELEPHONE ENCOUNTER
Left message to call back for: results  Information to relay to patient:  See note from provider below, results mailed to patient

## 2021-05-30 NOTE — TELEPHONE ENCOUNTER
ANTICOAGULATION  MANAGEMENT    Assessment     Today's INR result of 5.3 is Supratherapeutic (goal INR of 2.0-3.0)        Warfarin taken as previously instructed    Decreased greens/vitamin K intake may be affecting INR and decreased diet in general    No new medication/supplements affecting INR     Patient has had severe diarrhea for 7-8 days.    Continues to tolerate warfarin with no reported s/s of bleeding or thromboembolism     Previous INR was Therapeutic    Plan:     Spoke with Liz regarding INR result and instructed:     Warfarin Dosing Instructions:  hold warfarin tonight and take 2mg tomorrow then continue current warfarin dose 2 mg daily on Mon; and 4 mg daily rest of week  (0 % change)    Instructed patient to follow up no later than: 7-10 days    Education provided: importance of consistent vitamin K intake, impact of vitamin K foods on INR, importance of therapeutic range, target INR goal and significance of current INR result and monitoring for bleeding signs and symptoms  Liz states she will be going back to her normal diet  Liz verbalizes understanding and agrees to warfarin dosing plan.    Instructed to call the [facility] for any changes, questions or concerns. (#[phone])   ?   Gayla Ayon RN    Subjective/Objective:      Liz Gonzalez, a 89 y.o. female is on warfarin.     Liz reports:     Home warfarin dose: as updated on anticoagulation calendar per template     Missed doses: No     Medication changes:  No     S/S of bleeding or thromboembolism:  No     New Injury or illness:  Yes: yes diarrhea for a week     Changes in diet or alcohol consumption:  Yes decreased intake of greens and intake in general     Upcoming surgery, procedure or cardioversion:  No    Anticoagulation Episode Summary     Current INR goal:   2.0-3.0   TTR:   76.0 % (4.6 y)   Next INR check:   7/12/2019   INR from last check:   5.30! (7/2/2019)   Weekly max warfarin dose:      Target end  date:      INR check location:      Preferred lab:      Send INR reminders to:   [facility]    Indications    A-fib (H) (Resolved) [I48.91]           Comments:            Anticoagulation Care Providers     Provider Role Specialty Phone number    Natasha Nunez MD Referring Family Medicine [phone]

## 2021-05-30 NOTE — TELEPHONE ENCOUNTER
Test Results  Who is calling?:  Patient's daughter In-law, Mary (Consent on file)  Who ordered the test:  Hailey Naqvi FNP   Type of test: Lab  Date of test:  7/17/2019  Where was the test performed:    What are your questions/concerns?:  Caller stated the patient's INR was originally done at the clinic but was told the blood that was taken was going to be sent to the hospital to be tested to verify the result.    Caller stated she is leaving the country on Saturday for 10 days and is concerned about getting everything in place for the patient before she leaves in terms of medications and such so that everything is taken care of.  Okay to leave a detailed message?:  Yes   [phone] - Mary's cell phone    Caller is requesting a call back today if that is at all possible.

## 2021-05-30 NOTE — TELEPHONE ENCOUNTER
Patient Returning Call  Reason for call:  Patient calling back  Information relayed to patient:  Relayed below message. Patient reports still having Diarrhea and being unable to leave her house because of the diarrhea.  Patient has additional questions:  Yes  If YES, what are your questions/concerns:  Patient would like a referral to a Gastroenterologist.  Okay to leave a detailed message?: Yes

## 2021-05-30 NOTE — TELEPHONE ENCOUNTER
INR note reviewed stating hold for yesterday and today- recheck INR tomorrow- based on INR tomorrow they will make plan for anticoagulation.

## 2021-05-30 NOTE — TELEPHONE ENCOUNTER
Left message to call back for: Results  Information to relay to patient:  Unable to leave vm, Please relay message below from provider.

## 2021-05-30 NOTE — TELEPHONE ENCOUNTER
Patient Returning Call  Reason for call:  Return call  Information relayed to patient:  Patient was informed of Hailey Naqvi FNP's message below in regards to his lab results.  Patient has additional questions:  No  If YES, what are your questions/concerns:  n/a  Okay to leave a detailed message?: No call back needed

## 2021-05-30 NOTE — TELEPHONE ENCOUNTER
Patient Returning Call  Reason for call:  LMTCB   Information relayed to patient: Relayed result note and patient has questions regarding Gastro referral and Hematology referrals .   Patient has additional questions:  Yes  If YES, what are your questions/concerns:  Please follow up w/ Patient ASAP as she is still having ongoing symptoms of diarrhea.   Okay to leave a detailed message?: Yes

## 2021-05-30 NOTE — TELEPHONE ENCOUNTER
----- Message from SHUN Stevens sent at 7/9/2019 10:01 AM CDT -----  Stool studies returned negative. Is diarrhea still present? If so would consider further testing or GI consult.

## 2021-05-31 ENCOUNTER — RECORDS - HEALTHEAST (OUTPATIENT)
Dept: ADMINISTRATIVE | Facility: CLINIC | Age: 86
End: 2021-05-31

## 2021-05-31 VITALS — BODY MASS INDEX: 21.2 KG/M2 | HEIGHT: 58 IN | WEIGHT: 101 LBS

## 2021-05-31 VITALS — HEIGHT: 57 IN | WEIGHT: 104 LBS | BODY MASS INDEX: 22.44 KG/M2

## 2021-05-31 VITALS — BODY MASS INDEX: 21.41 KG/M2 | HEIGHT: 58 IN | WEIGHT: 102 LBS

## 2021-05-31 VITALS — WEIGHT: 102.56 LBS | BODY MASS INDEX: 21.44 KG/M2

## 2021-05-31 VITALS — WEIGHT: 103 LBS | HEIGHT: 58 IN | BODY MASS INDEX: 21.62 KG/M2

## 2021-05-31 VITALS — WEIGHT: 103 LBS | BODY MASS INDEX: 21.62 KG/M2 | HEIGHT: 58 IN

## 2021-05-31 NOTE — TELEPHONE ENCOUNTER
FYI - Status Update  Who is Calling: Patient  Update: Requests call back re: inr taken earlier today.  Okay to leave a detailed message?:  Yes

## 2021-05-31 NOTE — PROGRESS NOTES
"Cardiology Progress Note    Assessment:  Permanent atrial fibrillation, satisfactory rate control, on warfarin  Chronic diastolic heart failure, stable   Right heart failure and mild pulmonary hypertension secondary to chronic diastolic heart failure  History of stress cardiomyopathy, normalization of LV systolic performance  Coronary artery disease, mild, nonobstructive  COPD  Hypertension, good control  Weight loss/poor appetite    Plan:  She appears to be well compensated from cardiac standpoint.  Ventricular response rate is in the upper end of acceptable range but she appears to be tolerating it well.    Follow-up in 6 months    Subjective:   This is 89 y.o. female who comes in today for follow-up visit.  She remains physically active.  She continues to walk around her seniors facility without chest pain or unusual shortness of breath.  She has had poor appetite and no diarrhea.  She lost about 10 pounds since last time I saw her    Review of Systems:   General: WNL  Eyes: WNL  Ears/Nose/Throat: Hearing Loss  Lungs: WNL  Heart: WNL  Stomach: WNL  Bladder: WNL  Muscle/Joints: WNL  Skin: Poor Wound Healing  Nervous System: WNL  Mental Health: WNL     Blood: WNL    Objective:   BP (!) 84/44 (Patient Site: Left Arm, Patient Position: Sitting, Cuff Size: Adult Small)   Pulse 72   Resp 16   Ht 4' 9.09\" (1.45 m)   Wt (!) 93 lb (42.2 kg)   BMI 20.06 kg/m    Physical Exam:  GENERAL: no distress  NECK: No JVD  LUNGS: Clear to auscultation.  CARDIAC: irregular rhythm, S1 & S2 normal.  No heaves, thrills, gallops or murmurs.  ABDOMEN: flat, negative hepatosplenomegaly, soft and non-tender.  EXTREMITIES: No evidence of cyanosis, clubbing or edema.    Current Outpatient Medications   Medication Sig Dispense Refill     acetaminophen (TYLENOL) 325 MG tablet Take 650 mg by mouth as needed.        CALCIUM CARBONATE/VITAMIN D3 (CALTRATE 600 + D ORAL) Take 1 tablet by mouth daily.       carvedilol (COREG) 25 MG tablet TAKE " ONE TABLET BY MOUTH TWICE A DAY WITH MEALS 180 tablet 1     denosumab (PROLIA) 60 mg/mL Syrg Inject 60 mg under the skin every 6 (six) months. Outpatient Injection only.  Due around July 2014       diltiazem (CARDIZEM CD) 120 MG 24 hr capsule Take 2 capsules (240 mg total) by mouth daily. 180 capsule 3     FOLIC ACID/MV,FE,OTHER MIN (CENTRUM ORAL) Take 1 tablet by mouth daily.       furosemide (LASIX) 40 MG tablet TAKE ONE TABLET BY MOUTH TWICE A DAY AT 9 A.M. AND 6 P.M. 180 tablet 1     glucosamine-chondroitin 500-400 mg tablet Take 1 tablet by mouth 2 (two) times a day.       latanoprost (XALATAN) 0.005 % ophthalmic solution Administer 1 drop to both eyes bedtime.       lisinopril (PRINIVIL,ZESTRIL) 20 MG tablet Take 1 tablet (20 mg total) by mouth 2 (two) times a day. 180 tablet 3     timolol (BETIMOL) 0.5 % ophthalmic solution Administer 1 drop into the left eye daily.       warfarin (COUMADIN/JANTOVEN) 4 MG tablet TAKE 0.5-1 TABLETS (2-4 MG TOTAL) BY MOUTH DAILY. ADJUST DOSE PER INR RESULTS AS INSTRUCTED. 60 tablet 0     ANTIOX#10/OM3/DHA/EPA/LUT/ZEAX (I-CAPS ORAL) Take 1 tablet by mouth 2 (two) times a day.       cholecalciferol, vitamin D3, (CHOLECALCIFEROL) 1,000 unit tablet Take 1,000 Units by mouth bedtime.        nitroglycerin (NITROSTAT) 0.4 MG SL tablet Place 1 tablet (0.4 mg total) under the tongue every 5 (five) minutes as needed for chest pain (pt has never used). 25 tablet 11     timolol maleate (TIMOPTIC) 0.5 % ophthalmic solution USE 1 DROP(S) IN LEFT EYE ONCE A DAY. 90 DAY SUPPLY  3     Current Facility-Administered Medications   Medication Dose Route Frequency Provider Last Rate Last Dose     denosumab 60 mg (PROLIA 60 mg/ml)  60 mg Subcutaneous Q6 Months Hank Austin MD   60 mg at 06/11/19 1000       Cardiographics:    Echocardiogram: April 2016   Normal left ventricular size.  Normal left ventricular wall thickness.  Left ventricular ejection fraction is visually estimated to be 55%.  No  regional wall motion abnormalities.  Moderately enlarged right ventricle.  Right ventricular systolic function is mildly reduced.  Estimated right ventricular systolic pressure is 50 mmHg.  Severely enlarged left atrium.  Dilated IVC with reduced inspiratory collapse is consistent with elevated  right atrial pressure.  This study was compared with a previously done echocardiogram 11/13/14.  The results are similar.    Lab Results:       Lab Results   Component Value Date    CHOL 133 05/11/2010     Lab Results   Component Value Date    HDL 44 05/11/2010     Lab Results   Component Value Date    LDLCALC 68 05/11/2010     Lab Results   Component Value Date    TRIG 104 05/11/2010     BNP   Date Value Ref Range Status   03/10/2017 749 (H) 0 - 167 pg/mL Final       Galdino (Mk)  MD Roxana

## 2021-05-31 NOTE — TELEPHONE ENCOUNTER
ANTICOAGULATION  MANAGEMENT    Assessment     Today's INR result of 3.10 is Supratherapeutic (goal INR of 2.0-3.0)        Warfarin taken as previously instructed    Change in alcohol intake may be affecting INR    No new medication/supplements affecting INR    Continues to tolerate warfarin with no reported s/s of bleeding or thromboembolism     Previous INR was Therapeutic at 2.50 on 7/31/19/    Plan:     Spoke with Violet regarding INR result and instructed:    1.  She will resume her usual wkly vegetable intake     Warfarin Dosing Instructions:   (has 4mg tabs)   - Continue current warfarin dose 2 mg daily on Monday; and 4 mg daily rest of week.    Instructed patient to follow up no later than:  2 wks.    Education provided: importance of consistent vitamin K intake, impact of vitamin K foods on INR and target INR goal and significance of current INR result    Violet verbalizes understanding and agrees to warfarin dosing plan.    Instructed to call the [facility] for any changes, questions or concerns. (#[phone])   ?   Annetta Falk RN    Subjective/Objective:      Liz MARTINA Gonzalez, a 89 y.o. female is on warfarin.     Liz reports:     Home warfarin dose: as updated on anticoagulation calendar per template     Missed doses: No     Medication changes:  No     S/S of bleeding or thromboembolism:  No     New Injury or illness:  No     Changes in diet or alcohol consumption:  Yes:  Stated a decreased in her vegetable intake.  Reported drinks a glass of wine every night.     Upcoming surgery, procedure or cardioversion:  No    Anticoagulation Episode Summary     Current INR goal:   2.0-3.0   TTR:   75.3 % (4.7 y)   Next INR check:   8/28/2019   INR from last check:   3.10! (8/15/2019)   Weekly max warfarin dose:      Target end date:      INR check location:      Preferred lab:      Send INR reminders to:   [facility]    Indications    A-fib (H) (Resolved) [I48.91]           Comments:             Anticoagulation Care Providers     Provider Role Specialty Phone number    Natasha Nunez MD Referring Family Medicine [phone]

## 2021-05-31 NOTE — PATIENT INSTRUCTIONS - HE
Liz Gonzalez,    It was a pleasure to see you today at the [facility].     My recommendations after this visit include:    Same medications    WChris Schmidt MD, FACC, SUAD

## 2021-05-31 NOTE — TELEPHONE ENCOUNTER
RN cannot approve Refill Request    RN can NOT refill this medication Protocol failed and NO refill given. Last office visit: 1/8/2019 Galdino Schmidt MD (Ted) Last Physical: Visit date not found Last MTM visit: Visit date not found Last visit same specialty: 7/2/2019 Hailey Naqvi FNP.  Next visit within 3 mo: Visit date not found  Next physical within 3 mo: Visit date not found      Iliana PEDRAZA Deisi, [facility] Triage/Med Refill 8/15/2019    Requested Prescriptions   Pending Prescriptions Disp Refills     warfarin (COUMADIN/JANTOVEN) 4 MG tablet [Pharmacy Med Name: WARFARIN SODIUM 4 MG TABLET] 60 tablet 0     Sig: TAKE 0.5-1 TABLETS (2-4 MG TOTAL) BY MOUTH DAILY. ADJUST DOSE PER INR RESULTS AS INSTRUCTED.       Warfarin Refill Protocol  Failed - 8/15/2019  1:33 AM        Failed -  Route to appropriate pool/provider     Last Anticoagulation Summary:   Anticoagulation Episode Summary     Current INR goal:   2.0-3.0   TTR:   75.3 % (4.6 y)   Next INR check:   8/14/2019   INR from last check:   2.50 (7/31/2019)   Weekly max warfarin dose:      Target end date:      INR check location:      Preferred lab:      Send INR reminders to:   [facility]    Indications    A-fib (H) (Resolved) [I48.91]           Comments:            Anticoagulation Care Providers     Provider Role Specialty Phone number    Natasha Nunez MD Referring Family Medicine [phone]                Passed - Provider visit in last year     Last office visit with prescriber/PCP: 1/8/2019 Galdino Schmidt MD (Ted) OR same dept: 7/2/2019 Hailey Naqvi FNP OR same specialty: 7/2/2019 Hailey Naqvi FNP  Last physical: Visit date not found Last MTM visit: Visit date not found    Next appt within 3 mo: Visit date not found Next physical within 3 mo: Visit date not found  Prescriber OR PCP: Galdino Schmidt MD (Ted)  Last diagnosis associated with med order: 1. A-fib (H)  - warfarin (COUMADIN/JANTOVEN) 4 MG tablet [Pharmacy  Med Name: WARFARIN SODIUM 4 MG TABLET]; TAKE 0.5-1 TABLETS (2-4 MG TOTAL) BY MOUTH DAILY. ADJUST DOSE PER INR RESULTS AS INSTRUCTED.  Dispense: 60 tablet; Refill: 0    If protocol passes may refill for 6 months if within 3 months of last provider visit (or a total of 9 months).

## 2021-05-31 NOTE — TELEPHONE ENCOUNTER
ANTICOAGULATION  MANAGEMENT    Assessment     Today's INR result of 2.5 is Therapeutic (goal INR of 2.0-3.0)        Warfarin taken as previously instructed    No new diet changes affecting INR    No new medication/supplements affecting INR    Continues to tolerate warfarin with no reported s/s of bleeding or thromboembolism     Previous INR was Subtherapeutic    Plan:     Left a detailed message for Liz regarding INR result and instructed:     Warfarin Dosing Instructions:  Continue current warfarin dose 2 mg daily on Mon; and 4 mg daily rest of week  (0 % change)    Instructed patient to follow up no later than: two weeks    Education provided: importance of therapeutic range    Instructed to call the [facility] for any changes, questions or concerns. (#[phone])   ?   Gayla Ayon RN    Subjective/Objective:      Liz Gonzalez, a 89 y.o. female is on warfarin.     Liz reports:     Home warfarin dose: as updated on anticoagulation calendar per template     Missed doses: No     Medication changes:  No     S/S of bleeding or thromboembolism:  No     New Injury or illness:  No     Changes in diet or alcohol consumption:  No     Upcoming surgery, procedure or cardioversion:  No    Anticoagulation Episode Summary     Current INR goal:   2.0-3.0   TTR:   75.3 % (4.6 y)   Next INR check:   8/14/2019   INR from last check:   2.50 (7/31/2019)   Weekly max warfarin dose:      Target end date:      INR check location:      Preferred lab:      Send INR reminders to:   [facility]    Indications    A-fib (H) (Resolved) [I48.91]           Comments:            Anticoagulation Care Providers     Provider Role Specialty Phone number    Natasha Nunez MD Referring Family Medicine [phone]

## 2021-05-31 NOTE — TELEPHONE ENCOUNTER
ANTICOAGULATION  MANAGEMENT    Assessment     Today's INR result of 3.1 is Supratherapeutic (goal INR of 2.0-3.0)        Warfarin taken as previously instructed    No new diet changes affecting INR    No new medication/supplements affecting INR    Continues to tolerate warfarin with no reported s/s of bleeding or thromboembolism     Previous INR was Supratherapeutic    Plan:     Spoke with Liz regarding INR result and instructed:     Warfarin Dosing Instructions:  Continue current warfarin dose 2 mg daily on Mon; and 4 mg daily rest of week  (0 % change)    Instructed patient to follow up no later than: two weeks    Education provided: impact of vitamin K foods on INR, importance of therapeutic range and target INR goal and significance of current INR result  Liz agrees to increase her greens by 1-2 extra servings.  Liz verbalizes understanding and agrees to warfarin dosing plan.    Instructed to call the [facility] for any changes, questions or concerns. (#[phone])   ?   Gayla Ayon RN    Subjective/Objective:      Liz Gonzalez, a 89 y.o. female is on warfarin.     Liz reports:     Home warfarin dose: as updated on anticoagulation calendar per template     Missed doses: No     Medication changes:  No     S/S of bleeding or thromboembolism:  No     New Injury or illness:  No     Changes in diet or alcohol consumption:  No     Upcoming surgery, procedure or cardioversion:  No    Anticoagulation Episode Summary     Current INR goal:   2.0-3.0   TTR:   79.5 %   Next INR check:   9/17/2019   INR from last check:   3.10! (9/3/2019)   Weekly max warfarin dose:      Target end date:      INR check location:      Preferred lab:      Send INR reminders to:   [facility]    Indications    A-fib (H) (Resolved) [I48.91]           Comments:            Anticoagulation Care Providers     Provider Role Specialty Phone number    Natasha Nunez MD Referring Family Medicine  [phone]

## 2021-05-31 NOTE — TELEPHONE ENCOUNTER
FYI - Status Update  Who is Calling: Patient  Update: Please call the patient back, ACN not available at the time of the call, thank you.  Okay to leave a detailed message?:  No

## 2021-06-01 VITALS — HEIGHT: 57 IN | WEIGHT: 103 LBS | BODY MASS INDEX: 22.22 KG/M2

## 2021-06-01 VITALS — WEIGHT: 105.13 LBS | BODY MASS INDEX: 22.68 KG/M2 | HEIGHT: 57 IN

## 2021-06-01 VITALS — HEIGHT: 57 IN | BODY MASS INDEX: 22.44 KG/M2 | WEIGHT: 104 LBS

## 2021-06-01 NOTE — TELEPHONE ENCOUNTER
ANTICOAGULATION  MANAGEMENT    Assessment     Today's INR result of 2.60 is Therapeutic (goal INR of 2.0-3.0)        Warfarin taken as previously instructed    No new diet changes affecting INR    No new medication/supplements affecting INR    Continues to tolerate warfarin with no reported s/s of bleeding or thromboembolism     Previous INR was Supratherapeutic    Plan:     Left a detailed message for Violet / Mary regarding INR result and instructed:     Warfarin Dosing Instructions:  Continue current warfarin dose 2 mg daily on MON; and 4 mg daily rest of week  (0 % change)    Instructed patient to follow up no later than: 2 weeks    Education provided: target INR goal and significance of current INR result    Instructed to call the [facility] for any changes, questions or concerns. (#[phone])   ?   Lucila Manzo RN    Subjective/Objective:      Liz Gonzalez, a 89 y.o. female is on warfarin.     Liz reports:     Home warfarin dose: as updated on anticoagulation calendar per template     Missed doses: No     Medication changes:  No     S/S of bleeding or thromboembolism:  No     New Injury or illness:  No     Changes in diet or alcohol consumption:  No     Upcoming surgery, procedure or cardioversion:  No    Anticoagulation Episode Summary     Current INR goal:   2.0-3.0   TTR:   78.3 %   Next INR check:   10/8/2019   INR from last check:   2.60 (9/24/2019)   Weekly max warfarin dose:      Target end date:      INR check location:      Preferred lab:      Send INR reminders to:   [facility]    Indications    A-fib (H) (Resolved) [I48.91]           Comments:            Anticoagulation Care Providers     Provider Role Specialty Phone number    Natasha Nunez MD Referring Family Medicine [phone]    Hailey Naqvi FNP Responsible Nurse Practitioner [phone]

## 2021-06-01 NOTE — PROGRESS NOTES
Hearing Aid Evaluation:      History: The patient has a history of mild to moderate sloping to severe sensorineural hearing loss  , in both ears.  She is accompanied by her daughter-in-law today.  The patient s hearing concerns include: hearing in background noise, hearing soft speech and tinnitus.    Procedure: Hearing aid styles, technology levels, trial period and realistic expectations are discussed in detail.  The patient is interested in pursuing -in-the-ear hearing devices, in both ears. Bilateral Phonak Audeo M70-R devices are selected in color P1 coupled to 0xM receivers. Discussed c-shells, however patient is not interested at this time. The patient is set up with a hearing aid fitting appointment.    Plan:  Bilateral hearing aids are ordered on today s date. The hearing aid contract is signed, and the patient is provided a copy along with the [facility] brochure.   The patient should call this clinic with any questions or concerns. The patient verbalized understanding and is in agreement with this plan.    Blaine Garber, CCC-A  [place] Licensed Audiologist #[id number]

## 2021-06-02 ENCOUNTER — RECORDS - HEALTHEAST (OUTPATIENT)
Dept: ADMINISTRATIVE | Facility: CLINIC | Age: 86
End: 2021-06-02

## 2021-06-02 VITALS — HEIGHT: 57 IN | BODY MASS INDEX: 22.22 KG/M2 | WEIGHT: 103 LBS

## 2021-06-02 VITALS — BODY MASS INDEX: 22.65 KG/M2 | HEIGHT: 57 IN | WEIGHT: 105 LBS

## 2021-06-02 VITALS — HEIGHT: 57 IN | WEIGHT: 104 LBS | BODY MASS INDEX: 22.44 KG/M2

## 2021-06-02 VITALS — BODY MASS INDEX: 22.15 KG/M2 | WEIGHT: 102.38 LBS

## 2021-06-02 NOTE — TELEPHONE ENCOUNTER
ANTICOAGULATION  MANAGEMENT    Assessment     Today's INR result of 2.7 is Therapeutic (goal INR of 2.0-3.0)        Warfarin taken as previously instructed    No new diet changes affecting INR    No new medication/supplements affecting INR    Continues to tolerate warfarin with no reported s/s of bleeding or thromboembolism     Previous INR was Therapeutic    Plan:     Spoke with Liz regarding INR result and instructed:     Warfarin Dosing Instructions:  Continue current warfarin dose 2 mg daily on Mon; and 4 mg daily rest of week  (0 % change)    Instructed patient to follow up no later than: 4-6 weeks    Education provided: importance of therapeutic range    Liz verbalizes understanding and agrees to warfarin dosing plan.    Instructed to call the [facility] for any changes, questions or concerns. (#[phone])   ?   Gayla Ayon RN    Subjective/Objective:      Liz Gonzalez, a 89 y.o. female is on warfarin.     Liz reports:     Home warfarin dose: as updated on anticoagulation calendar per template     Missed doses: No     Medication changes:  No     S/S of bleeding or thromboembolism:  No     New Injury or illness:  No     Changes in diet or alcohol consumption:  No     Upcoming surgery, procedure or cardioversion:  No    Anticoagulation Episode Summary     Current INR goal:   2.0-3.0   TTR:   78.3 %   Next INR check:   12/2/2019   INR from last check:   2.70 (10/21/2019)   Weekly max warfarin dose:      Target end date:      INR check location:      Preferred lab:      Send INR reminders to:   [facility]    Indications    A-fib (H) (Resolved) [I48.91]           Comments:            Anticoagulation Care Providers     Provider Role Specialty Phone number    Natasha Nunez MD Referring Family Medicine [phone]    Hailey Naqvi FNP Responsible Nurse Practitioner [phone]

## 2021-06-02 NOTE — PROGRESS NOTES
Hearing Aid Fitting    The patient was seen today for a hearing aid fitting.  She is accompanied by her daughter-in-law, Mary.  She was fit with bilateral Phonak Audeo M70-R devices coupled to 0xM receivers and cap domes. Did not attach retention locks at this time as Violet has dexterity concerns.  Real ear measurements revealed a good match to NAL-NL 2 prescriptive targets.    Hearing aid:  : Phonak  Devices:  Audeo M70-R  Style:  MARCIA KAUR  Serial numbers:  [id number], [id number]  Warranty expiration:  12/29/2022    Otoscopy reveals non-occluding cerumen, bilaterally.  The patient reports satisfaction with the fit and sound quality of the instruments at 90% overall gain. Use, care, trial period and realistic expectations were reviewed in detail.  Push button is set to on/off. Will discuss volume control option again at her recheck appointment.  She is able to demonstrate independent manipulation of the instruments with  and insertion/removal.  She is given written instruction on use, care, and warranty.  She sets up a follow up appointment in 2 weeks. A $250 hearing aid deposit is collected today.     The patient verbalized understanding and is in agreement with this plan.    Blaine Garber, CCC-A  [place] Licensed Audiologist #[id number]

## 2021-06-02 NOTE — TELEPHONE ENCOUNTER
Spoke with Mary about the hearing aid deposit. She wondered if she needed to pay it already. Explained it will be taken at the upcoming fitting appointment.     Chelsea Garber.RADHA, CCC-A  [place] Licensed Audiologist #[id number]

## 2021-06-02 NOTE — TELEPHONE ENCOUNTER
Liz Rai [birth year] Mary [phone] daughter in law has a billing question able deposit, billing department was not able to help

## 2021-06-03 VITALS
BODY MASS INDEX: 21.57 KG/M2 | OXYGEN SATURATION: 97 % | WEIGHT: 100 LBS | HEART RATE: 124 BPM | DIASTOLIC BLOOD PRESSURE: 85 MMHG | RESPIRATION RATE: 20 BRPM | SYSTOLIC BLOOD PRESSURE: 127 MMHG

## 2021-06-03 VITALS
BODY MASS INDEX: 21.72 KG/M2 | SYSTOLIC BLOOD PRESSURE: 125 MMHG | HEART RATE: 88 BPM | DIASTOLIC BLOOD PRESSURE: 63 MMHG | OXYGEN SATURATION: 100 % | WEIGHT: 100.7 LBS

## 2021-06-03 VITALS — BODY MASS INDEX: 21.31 KG/M2 | WEIGHT: 98.8 LBS | HEIGHT: 57 IN

## 2021-06-03 VITALS — HEIGHT: 57 IN | BODY MASS INDEX: 20.06 KG/M2 | WEIGHT: 93 LBS

## 2021-06-03 VITALS — WEIGHT: 99 LBS | BODY MASS INDEX: 21.42 KG/M2

## 2021-06-03 NOTE — TELEPHONE ENCOUNTER
ANTICOAGULATION  MANAGEMENT    Assessment     Today's INR result of 3.1 is Supratherapeutic (goal INR of 2.0-3.0)        Warfarin taken as previously instructed    Cranberries may be affecting diet and INR    No new medication/supplements affecting INR    Continues to tolerate warfarin with no reported s/s of bleeding or thromboembolism     Previous INR was Supratherapeutic    Plan:     Spoke with Liz regarding INR result and instructed:     Warfarin Dosing Instructions:  Continue current warfarin dose 2 mg daily on Mon; and 4 mg daily rest of week  (0 % change)    Instructed patient to follow up no later than: two weeks    Education provided: importance of therapeutic range and target INR goal and significance of current INR result    Liz verbalizes understanding and agrees to warfarin dosing plan.    Instructed to call the [facility] for any changes, questions or concerns. (#[phone])   ?   Gayla Ayon RN    Subjective/Objective:      Liz Gonzalez, a 89 y.o. female is on warfarin.     Liz reports:     Home warfarin dose: as updated on anticoagulation calendar per template     Missed doses: No     Medication changes:  No     S/S of bleeding or thromboembolism:  No     New Injury or illness:  No     Changes in diet or alcohol consumption:  Had cranberries over the holiday     Upcoming surgery, procedure or cardioversion:  No    Anticoagulation Episode Summary     Current INR goal:   2.0-3.0   TTR:   68.8 % (1 y)   Next INR check:   12/16/2019   INR from last check:   3.10! (12/2/2019)   Weekly max warfarin dose:      Target end date:      INR check location:      Preferred lab:      Send INR reminders to:   [facility]    Indications    A-fib (H) (Resolved) [I48.91]           Comments:            Anticoagulation Care Providers     Provider Role Specialty Phone number    Natasha Nunez MD Referring Family Medicine [phone]    Hailey Naqvi FNP Responsible Nurse  Practitioner [phone]

## 2021-06-03 NOTE — PROGRESS NOTES
Hearing Aid Check    Liz Gonzalez returns today for a hearing aid check. She is accompanied today with her daughter-in-law.  She reports that she is pleased with her hearing aids. She was able to hear every word at her [employer] service. She found them helpful in her facility dining room. She wonders how to clean them.    Visual inspection:  She arrives with hearing aids inserted properly.   Action:  Showed Violet how to clean her hearing aids. Due to dexterity issues, her daughter-in-law plans to help her. Provided her with additional [place] wax traps and small cap domes. Reviewed repair drop-off policy with her.        She will follow up in 1 year or as needed.    Blaine Garber, CCC-A  [place] Licensed Audiologist #[id number]

## 2021-06-03 NOTE — TELEPHONE ENCOUNTER
ANTICOAGULATION  MANAGEMENT    Assessment     Today's INR result of 3.9 is Supratherapeutic (goal INR of 2.0-3.0)        Warfarin taken as previously instructed    No new diet changes affecting INR. Denies any recent illness    Concurrent use of Tylenol  and warfarin may increase risk of bleeding, but not expected to affect INR (micromedex).  Patient states she has been taking tylenol for years, but the last two weeks had been taking tylenol extra strength.  Resumed taking regular tylenol yesterday, 11/18.     Continues to tolerate warfarin with no reported s/s of bleeding or thromboembolism     Previous INR was Therapeutic x 2 on current dose     Plan:     Spoke with Violet and daughter in law, Mary regarding INR result and instructed:     Warfarin Dosing Instructions:  Hold warfarin dose today 11/19 then continue current warfarin dose 2 mg daily on Mon; and 4 mg daily rest of week  (0 % change)    Instructed patient to follow up no later than: 1- 2 weeks     Education provided: importance of therapeutic range, target INR goal and significance of current INR result, monitoring for bleeding signs and symptoms and when to seek medical attention/emergency care    Enedina verbalizes understanding and agrees to warfarin dosing plan.    Instructed to call the [facility] for any changes, questions or concerns. (#[phone])   ?   Frannie Min RN    Subjective/Objective:      Liz Gonzalez, a 89 y.o. female is on warfarin.     Liz reports:     Home warfarin dose: verbally confirmed home dose with Dannielle and updated on anticoagulation calendar     Missed doses: No     Medication changes:  Yes: see above     S/S of bleeding or thromboembolism:  No     New Injury or illness:  No     Changes in diet or alcohol consumption:  No     Upcoming surgery, procedure or cardioversion:  No    Anticoagulation Episode Summary     Current INR goal:   2.0-3.0   TTR:   72.3 %   Next INR check:   12/3/2019   INR  from last check:   3.90! (11/19/2019)   Weekly max warfarin dose:      Target end date:      INR check location:      Preferred lab:      Send INR reminders to:   [facility]    Indications    A-fib (H) (Resolved) [I48.91]           Comments:            Anticoagulation Care Providers     Provider Role Specialty Phone number    Natasha Nunez MD Referring Family Medicine [phone]    Hailey Naqvi FNP Responsible Nurse Practitioner [phone]

## 2021-06-03 NOTE — PROGRESS NOTES
Assessment:     1. Osteoarthritis of left hip, unspecified osteoarthritis type  TENS Unit    lidocaine (LIDODERM) 5 %   2. A-fib (H)  warfarin (COUMADIN/JANTOVEN) 4 MG tablet    INR   3. Long term (current) use of anticoagulants  warfarin (COUMADIN/JANTOVEN) 4 MG tablet   4. Essential hypertension  Basic Metabolic Panel   5. Right shoulder pain, unspecified chronicity       Offered a cortisone injection with some relief from anesthetic for her right shoulder that she declined.  Reviewed that she is not a candidate for NSAIDs with being on Coumadin with risk of bleeding that she is aware about.  Discussed that narcotic pain medication may not be an option for her.  Discussed pain medications like gabapentin that she declines.    At this time we will go ahead with lidocaine local patches that she has used in the past with benefit in order a TENS unit for her.  Discussed warm packs.  Symptom care.     Plan:      The diagnosis was discussed with the patient and evaluation and treatment plans outlined.  Follow up: Return in about 3 months (around 2/19/2020) for Annual physical.     Subjective:      Liz Gonzalez is a  female who presents for evaluation of   Chief Complaint   Patient presents with     Establish Care     Pt here today to establish care with a new provider- former Pt of Dr Nunez     Hip Pain     Discuss ongoing LT hip pain- has Hx of back surgery 8 yrs ago, Pt does not want any further surgeries     Patient is here to establish care.  She is to follow-up with Dr. Nunez.  Her main concerns are her aches and pains.  She is status post left hip surgery but continues to have left hip pain.  This is really bothersome to her.  She continues to take Tylenol Extra Strength 2-3 times a day with little benefit.  Patient informs that she is followed with orthopedics and has had cortisone injections done without any benefit.  In addition to her left hip pain, she also has right shoulder pain and reaching  out to get anything from [facility] is becoming very difficult.  She lives by herself.  Her main concern is to have pain medication other than Tylenol.  She does not report any chest pain, nausea vomiting or appetite loss.  She is on anticoagulation for her atrial fibrillation.    The following portions of the patient's history were reviewed and updated as appropriate: allergies, current medications, past family history, past medical history, past social history, past surgical history and problem list.  Allergies   Allergen Reactions     Penicillins      Diarrhea and weight loss, tolerated cefazolin with surgery 6/2014       Current Outpatient Medications on File Prior to Visit   Medication Sig Dispense Refill     acetaminophen (TYLENOL) 325 MG tablet Take 650 mg by mouth as needed.        ANTIOX#10/OM3/DHA/EPA/LUT/ZEAX (I-CAPS ORAL) Take 1 tablet by mouth 2 (two) times a day.       CALCIUM CARBONATE/VITAMIN D3 (CALTRATE 600 + D ORAL) Take 1 tablet by mouth daily.       carvedilol (COREG) 25 MG tablet TAKE ONE TABLET BY MOUTH TWICE A DAY WITH MEALS 180 tablet 1     cholecalciferol, vitamin D3, (CHOLECALCIFEROL) 1,000 unit tablet Take 1,000 Units by mouth bedtime.        denosumab (PROLIA) 60 mg/mL Syrg Inject 60 mg under the skin every 6 (six) months. Outpatient Injection only.  Due around July 2014       diltiazem (CARDIZEM CD) 120 MG 24 hr capsule Take 2 capsules (240 mg total) by mouth daily. 180 capsule 3     FOLIC ACID/MV,FE,OTHER MIN (CENTRUM ORAL) Take 1 tablet by mouth daily.       furosemide (LASIX) 40 MG tablet TAKE ONE TABLET BY MOUTH TWICE A DAY AT 9 A.M. AND 6 P.M. 180 tablet 1     glucosamine-chondroitin 500-400 mg tablet Take 1 tablet by mouth 2 (two) times a day.       latanoprost (XALATAN) 0.005 % ophthalmic solution Administer 1 drop to both eyes bedtime.       lisinopril (PRINIVIL,ZESTRIL) 20 MG tablet Take 1 tablet (20 mg total) by mouth 2 (two) times a day. 180 tablet 3     nitroglycerin (NITROSTAT)  0.4 MG SL tablet Place 1 tablet (0.4 mg total) under the tongue every 5 (five) minutes as needed for chest pain (pt has never used). 25 tablet 11     timolol (BETIMOL) 0.5 % ophthalmic solution Administer 1 drop into the left eye daily.       timolol maleate (TIMOPTIC) 0.5 % ophthalmic solution USE 1 DROP(S) IN LEFT EYE ONCE A DAY. 90 DAY SUPPLY  3     Current Facility-Administered Medications on File Prior to Visit   Medication Dose Route Frequency Provider Last Rate Last Dose     denosumab 60 mg (PROLIA 60 mg/ml)  60 mg Subcutaneous Q6 Months Hank Austin MD   60 mg at 06/11/19 1000       Patient Active Problem List   Diagnosis     Malignant neoplasm of bronchus and lung (H)     Cataract     Osteoporosis     Sciatica     Chronic diastolic congestive heart failure (H)     Restless Legs Syndrome     Essential Hypertension     Permanent atrial fibrillation     Unspecified glaucoma     Osteoarthritis     Coronary Artery Disease     Lumbar stenosis with neurogenic claudication       Past Medical History:   Diagnosis Date     Atrial fibrillation (H)      Cancer (H) 2010    squamous cell CA in lower right lobe     CHF (congestive heart failure) (H)      Coronary artery disease      Glaucoma      Hypertension      Hyponatremia      Lung cancer (H)      Osteoarthritis      Osteoporosis      Prinzmetal's angina (H)      Restless leg syndrome      Spinal stenosis        Past Surgical History:   Procedure Laterality Date     BACK SURGERY       breast biopsies      X2     BREAST BIOPSY Left      DILATION AND CURETTAGE OF UTERUS       KNEE SURGERY Left 1992    removed bone chips     LUMBAR LAMINECTOMY Bilateral 6/20/2014    Procedure: POSTERIOR LUMBAR DECOMPRESSION LAMINECTOMY L4-5 BILATERAL;  Surgeon: Florentin Blackmon MD;  [place] [facility] [place]  Service:      LUNG CANCER SURGERY Right 2010    removed lower lobe     LUNG SURGERY  2010    squamous cell CA, right lower lobectomy     OK BIOPSY OF BREAST, INCISIONAL       Description: Biopsy Breast Open;  Recorded: 2008;     NC BIOPSY OF BREAST, INCISIONAL      Description: Biopsy Breast Open;  Recorded: 2008;     NC DILATION/CURETTAGE,DIAGNOSTIC      Description: Dilation And Curettage;  Recorded: 2008;     NC REMOVAL OF LUNG,LOBECTOMY      Description: Lung Lobectomy;  Recorded: 2010;  Comments: Lower lobectomy for Ca.       Family History   Problem Relation Age of Onset     No Medical Problems Mother      No Medical Problems Father      No Medical Problems Sister      No Medical Problems Daughter      No Medical Problems Maternal Grandmother      No Medical Problems Maternal Grandfather      No Medical Problems Paternal Grandmother      No Medical Problems Paternal Grandfather      No Medical Problems Maternal Aunt      No Medical Problems Paternal Aunt      BRCA 1/2 Neg Hx      Breast cancer Neg Hx      Cancer Neg Hx      Colon cancer Neg Hx      Endometrial cancer Neg Hx      Ovarian cancer Neg Hx        Social History     Socioeconomic History     Marital status:      Spouse name: None     Number of children: None     Years of education: None     Highest education level: None   Occupational History     None   Social Needs     Financial resource strain: None     Food insecurity:     Worry: None     Inability: None     Transportation needs:     Medical: None     Non-medical: None   Tobacco Use     Smoking status: Former Smoker     Packs/day: 0.25     Years: 50.00     Pack years: 12.50     Last attempt to quit: 1998     Years since quittin.8     Smokeless tobacco: Never Used   Substance and Sexual Activity     Alcohol use: Yes     Alcohol/week: 7.0 standard drinks     Types: 7 Glasses of wine per week     Comment: 1 glass wine daily     Drug use: No     Sexual activity: Not Currently   Lifestyle     Physical activity:     Days per week: None     Minutes per session: None     Stress: None   Relationships     Social connections:     Talks  on phone: None     Gets together: None     Attends Worship service: None     Active member of club or organization: None     Attends meetings of clubs or organizations: None     Relationship status: None     Intimate partner violence:     Fear of current or ex partner: None     Emotionally abused: None     Physically abused: None     Forced sexual activity: None   Other Topics Concern     None   Social History Narrative    Lives by self.       Review of Systems  Constitutional: negative  Respiratory: negative  Cardiovascular: negative  Hematologic/lymphatic: negative       Objective:     /85 (Patient Site: Left Arm, Patient Position: Sitting, Cuff Size: Adult Regular)   Pulse (!) 124   Resp 20   Wt 100 lb (45.4 kg)   SpO2 97%   BMI 21.57 kg/m    General Appearance:  alert, oriented and no distress  HEENT Exam: Oropharynx clear without lesion or exudate  Neck:  supple, no adenopathy, thyroid normal  Heart: Normal heart sounds, S1 and S2, No murmurs.  Lungs: Normal breath sounds, Clear to auscultation bilateral  MSK: The range of motion is intact on right shoulder, over the head movement is limited.  Neurological exam: gait appears at baseline, alert and oriented X 3  Hem/Lymph/Immuno exam: negative findings:  no cervical nodes, no supraclavicular nodes, no axillary nodes

## 2021-06-03 NOTE — TELEPHONE ENCOUNTER
Who is calling:  Mary  Reason for Call:  Returning INR call.  Date of last appointment with primary care: 11/19/19  Okay to leave a detailed message: Yes, if in the next 10 mins or so, please call [phone]. Otherwise, please call Mary at [phone].

## 2021-06-03 NOTE — PATIENT INSTRUCTIONS - HE
Patient Education     What Is Arthritis?  Arthritis is a disease that affects the joints. Joints are the parts where bones meet and move. It can affect any joint in your body. There are more than 100 types of arthritis. They include:     Osteoarthritis    Rheumatoid arthritis    Gout    Lupus  If your symptoms are mild, medicines may be enough to ease pain and swelling. For more severe arthritis, you may need surgery. This can improve the condition of the joint. Or it can replace part or all of the joint.  Patient Education     Managing Chronic Pain   Being in pain can be exhausting. You may find you have trouble working, sleeping, or just doing day-to-day tasks. But you can learn to manage pain, feel better, and regain control of your life.   Understanding chronic pain  Chronic pain is a serious medical problem. It is defined as pain that lasts longer than 3 months. Chronic pain includes pain that you feel regularly, even if it comes and goes. The pain may be from an ongoing injury or health problem. Or it may be because of a chronic pain syndrome, such as fibromyalgia. Sometimes pain persists when no cause can be found.   Pain should be treated  You have a right to have your pain treated. Untreated chronic pain can affect your overall health. It can lead to depression, anxiety, anger, and personality changes. It can also disrupt work, sleep, relationships, and other aspects of normal life. It may not be possible to relieve all of your pain. But it can be reduced to a level you can cope with.   Your role in treatment  Your healthcare provider will work closely with you on a plan to manage your pain. But it s up to you to put this plan into action. Control of chronic pain is done mainly through self-management. This means that you take an active role in your care. Getting support from family and friends is important too.   Planning your treatment  Your healthcare provider will first look for a cause of your pain  that can be treated. He or she will also assess your pain level. This may be done by asking you to rate your pain on a scale from 1 (low pain) to 10 (severe pain). Your provider will also ask you to describe the pain. For example, is your pain sharp or dull? Is it constant or does it come and go? You may be asked to keep a pain log. This is a diary in which you track your pain. It may help identify things that tend to make your pain worse. You and your healthcare provider can make a plan to help prevent and cope with pain on a daily basis. In some cases, you may be referred to a special pain program or clinic. Your treatment plan may include:    Medicines    Complementary therapies    Mind and body therapies    Other medical treatments    Getting physical activity   Medicines  Medicine will most likely be a part of your treatment plan. Your provider will evaluate which are the best medicines for your pain. You may use over-the-counter or prescription medicines. You may need to take more than one medicine. It may take some time to find the best medicine or combination of medicines for your pain. Take all medicines as directed. Pain medicines can be used in many ways. You may take medicines:    Every day to help   stay ahead  of the pain so that it doesn t flare up    At times when pain is worse than usual    Before activities that tend to trigger pain    To decrease sensitivity to pain  Medicines for chronic pain include:    Acetaminophen    Anticonvulsants to treat nerve pain called neuropathy    Antidepressants to treat neuropathy    Topical medicines. These are put on the skin.    Opioids, or narcotics, to treat severe pain. These very strong medicines can help ease certain kinds of pain. They most often are used for short periods of time. Your provider will monitor your care very closely if you take opioids.   Complementary therapies  These are treatments that can be used along with medical care to help relieve  pain. Look for a licensed practitioner with experience treating chronic pain. Talk with your healthcare provider about using complementary therapies such as:    Massage    Acupuncture and acupressure    Chiropractic    Vitamins or herbal supplements   Mind/body therapies  The brain and the body are both part of the pain response. The brain reads the pain signals from the body. This means that your mind has some control over how pain signals are processed. Mind/body therapies may help change how your brain reads pain signals. They may be learned with the help of a trained therapist or in a class. They include:    Deep breathing    Distraction    Visualization    Meditation    Biofeedback   Other medical treatments  If other treatments don t work for you, one of these procedures or devices may help:    Nerve blocks to numb nerves in a painful area    Trigger point injections for painful muscles    Steroid injections for joint pain     Transcutaneous electrical nerve stimulation (TENS) to block pain signals to the brain    Spinal stimulation to block spinal pain    Implanted spinal pump that contains pain medicine    Ablation using heat, cold, or chemicals to destroy painful nerves                                                                                                                    Getting physical activity  Being physically active has many benefits. It can improve your ability to cope with pain. It may also help improve your mood, sleep, and overall health. Your healthcare provider can help you plan an exercise program that s right for your needs. This may include:    Stretching and range-of-motion exercises    Low-impact exercise such as walking, biking, swimming, and other water exercise    Strength training using light weights    Walking up the stairs instead of taking the elevator    Riding a bike instead of driving    Parking your car farther from your destination   You may need to not do high-impact  activities. These involve jumping, running, or sudden starts, stops, or changes of direction. If you haven t exercised in a long time or you have physical limitations, your healthcare provider may refer you to a physical therapist. He or she can teach you stretches and exercises that fit your condition and fitness level.   Being active and healthy  A healthier lifestyle makes it easier to cope with pain and function better. Follow these tips:    Choose a balance of healthy foods and drinks.    Limit alcohol and caffeine.    Go to bed at about the same time each day.    Don t let pain keep you from others. Spend time with friends and family.    Keep your mind active. Read books or take classes.    If you re not working, volunteer or join a club or social group.   Getting support  A support group lets you talk with others who also have chronic pain. Chronic pain support groups can help you feel less isolated. They can also give you tips for coping with pain. To find a local support group, contact your nearest hospital or pain clinic.   You may also want to try counseling. Counseling can help you learn coping skills and methods such as visualization. It can also help with mood problems. When choosing a counselor, look for someone who has worked with people who have chronic pain.   See your provider for regular visits and let him or her know how well treatments are working for you. Also reach out to family and friends for help and support.                              For more support and information, contact these groups:    American Academy of Pain Management, www.aapainmanage.org    American Academy of Pain Medicine, www.painmed.org    American Chronic Pain Association, www.theacpa.org    National Pain Foundation, www.thenationalpainfoundation.org  Date Last Reviewed: 12/1/2017 2000-2019 [employer]. [address] [place] [address]. All rights reserved. This information is not intended as a  substitute for professional medical care. Always follow your healthcare professional's instructions.         What causes arthritis?  Cartilage is a smooth substance that protects the ends of your bones and provides cushioning. When you have arthritis, this cartilage breaks down and can no longer protect your bones. This can happen from an autoimmune disease or it can happen from wear and tear, infections, or trauma. The bones rub against each other, causing pain and swelling. Over time, small pieces of rough or splintered bone (bone spurs) may develop. The joint's range of motion can become limited.  Symptoms  Some of the more common symptoms of arthritis include:    Joint pain and stiffness. These symptoms get worse with long periods of rest. They may also get worse from using a joint too long or too hard.    Joints that have lost normal shape and motion. They may look swollen and be hard to move.    Sore, inflamed joints. They may look red and feel warm.    Grinding or popping noise. This happens with joint movement.    Tiredness (fatigue). You may feel tired all the time.  Reducing symptoms  You can help ease symptoms in these ways:    Losing weight    Exercising    Strengthening muscles around the joint to reduce the strain on the joint    Using hot and cold packs on your joints    Using over-the-counter and prescription medicines  Talk with your healthcare provider about the best treatments for your condition.  Date Last Reviewed: 7/1/2019 2000-2019 [employer]. [address] [place] [address], [place] [address]. All rights reserved. This information is not intended as a substitute for professional medical care. Always follow your healthcare professional's instructions.

## 2021-06-04 VITALS
DIASTOLIC BLOOD PRESSURE: 74 MMHG | BODY MASS INDEX: 20.71 KG/M2 | OXYGEN SATURATION: 100 % | SYSTOLIC BLOOD PRESSURE: 116 MMHG | HEART RATE: 135 BPM | WEIGHT: 96 LBS | RESPIRATION RATE: 12 BRPM

## 2021-06-04 VITALS
TEMPERATURE: 96.4 F | WEIGHT: 96.2 LBS | HEART RATE: 110 BPM | DIASTOLIC BLOOD PRESSURE: 82 MMHG | RESPIRATION RATE: 16 BRPM | BODY MASS INDEX: 20.75 KG/M2 | SYSTOLIC BLOOD PRESSURE: 113 MMHG

## 2021-06-04 NOTE — PROGRESS NOTES
"Prolia Injection Phone Screen      Screening questions have been asked 2-3 days prior to administration visit for Prolia. If any questions are answered with \"Yes,\" this phone encounter were will routed to ordering provider for further evaluation.     1.  When was the last injection?  6/11/19    2.  Has insurance for this injection been verified?  Yes    3.  Did you experience any new onset achiness or rashes that lasted for over a month with your previous Prolia injection?   No    4.  Do you have a fever over 101?F or a new deep cough that is unusual for you today? No    5.  Have you started any new medications in the last 6 months that you were told could affect your immune system? These may have been prescribed by oncologist, transplant, rheumatology, or dermatology.   No    6.  In the last 6 months have you have gastric bypass or parathyroid surgery?   No    7.  Do you plan dental work requiring drilling into the bone such as implants/extractions or oral surgery in the next 2-3 months?   No    8. Do you have new insurance since the last injection? No     Patient informed if symptoms discussed above present prior to their administration appointment, they are to notify clinic immediately.     Nikki Perry            "

## 2021-06-04 NOTE — PATIENT INSTRUCTIONS - HE
Prolia 18th in the next few days after I see the xray result.  Prolia 19th in 6 months with my nurse. I will see you in 1 year.    DXA in 6/2020 .   Phone number to schedule [phone].    Daily calcium need is 8994-7799 mg a day from the diet and supplements.  Calcium citrate is easier to digest.  Vitamin D 2000 IU daily recommended.

## 2021-06-04 NOTE — PROGRESS NOTES
1. Age-related osteoporosis without current pathological fracture  DXA Bone Density Scan    Vitamin D, Total (25-Hydroxy)   2. Hip pain, left  XR Femur Left 2 VWS     Patient was educated on safety of Prolia utilizing Patient Counseling Chart for Healthcare Providers, as outlined by the Prolia REMS progam.     Return in about 6 months (around 6/11/2020) for Recheck, Prolia injection.    Patient Instructions   Prolia 18th in the next few days after I see the xray result.  Prolia 19th in 6 months with my nurse. I will see you in 1 year.    DXA in 6/2020 .   Phone number to schedule [phone].    Daily calcium need is 6277-9893 mg a day from the diet and supplements.  Calcium citrate is easier to digest.  Vitamin D 2000 IU daily recommended.      Chief Complaint   Patient presents with     Osteoporosis Follow Up     Osteo F/U       /63   Pulse 88   Wt 100 lb 11.2 oz (45.7 kg)   SpO2 100%   BMI 21.72 kg/m        Did you experience any problems with previous Prolia injection? no  Any medication change in the last 6 months? no  Did you take prednisone or other immunosupressant drugs in the last 6 months   (chemo, transplant, rheum, dermatology conditions)? no  Did you have any serious infection in the last 6 months?no  Any recent hospitalizations?no  Do you plan any dental work in the next 2-3 months?no  How much calcium do you take daily from the diet and supplements?1200 mg  How much vit D do you take daily? Only from calcium pills, will have to check the level today  Last DXA? 6/2018,  Reviewed and discussed      Patient is here today for the 18th Prolia injection. Patient tolerated previous injections well.   She is complaining about left hip and leg pain over the last few months, worse with standing and walking. I reviewed the xray of the pelvis and hip from 4/2019. I am concerned about possible Atypical femoral fracture and would like to see femur xray today. If negative, we can continue Prolia  treatment.  On the exam today, her gait is stable, slightly antalgic with the pain in her left lower back and left hip and leg down to knee level.  We discussed calcium and vit D daily needs today.   I reviewed the labs from Nov.  Component      Latest Ref Rng & Units 11/19/2019   Sodium      136 - 145 mmol/L 141   Potassium      3.5 - 5.0 mmol/L 4.6   Chloride      98 - 107 mmol/L 108 (H)   CO2      22 - 31 mmol/L 21 (L)   Anion Gap, Calculation      5 - 18 mmol/L 12   Glucose      70 - 125 mg/dL 110   Calcium      8.5 - 10.5 mg/dL 8.9   BUN      8 - 28 mg/dL 48 (H)   Creatinine      0.60 - 1.10 mg/dL 1.82 (H)   GFR MDRD Af Amer      >60 mL/min/1.73m2 32 (L)   GFR MDRD Non Af Amer      >60 mL/min/1.73m2 26 (L)     Next Prolia injection will be in 6 months.     25 minutes spent with the patient and more then 50 % of the time in counseling.  This note has been dictated using voice recognition software. Any grammatical or context distortions are unintentional and inherent to the software      Patient Active Problem List   Diagnosis     Malignant neoplasm of bronchus and lung (H)     Cataract     Osteoporosis     Sciatica     Chronic diastolic congestive heart failure (H)     Restless Legs Syndrome     Essential Hypertension     Permanent atrial fibrillation     Unspecified glaucoma     Osteoarthritis     Coronary Artery Disease     Lumbar stenosis with neurogenic claudication     Right shoulder pain, unspecified chronicity       Current Outpatient Medications on File Prior to Visit   Medication Sig Dispense Refill     acetaminophen (TYLENOL) 325 MG tablet Take 650 mg by mouth as needed.        ANTIOX#10/OM3/DHA/EPA/LUT/ZEAX (I-CAPS ORAL) Take 1 tablet by mouth 2 (two) times a day.       CALCIUM CARBONATE/VITAMIN D3 (CALTRATE 600 + D ORAL) Take 1 tablet by mouth daily.       carvedilol (COREG) 25 MG tablet TAKE ONE TABLET BY MOUTH TWICE A DAY WITH MEALS 180 tablet 1     diltiazem (CARDIZEM CD) 120 MG 24 hr capsule Take  2 capsules (240 mg total) by mouth daily. 180 capsule 3     FOLIC ACID/MV,FE,OTHER MIN (CENTRUM ORAL) Take 1 tablet by mouth daily.       furosemide (LASIX) 40 MG tablet TAKE ONE TABLET BY MOUTH TWICE A DAY AT 9 A.M. AND 6 P.M. 180 tablet 1     glucosamine-chondroitin 500-400 mg tablet Take 1 tablet by mouth 2 (two) times a day.       latanoprost (XALATAN) 0.005 % ophthalmic solution Administer 1 drop to both eyes bedtime.       lidocaine (LIDODERM) 5 % Remove & Discard patch within 12 hours or as directed by MD 30 patch 3     lisinopril (PRINIVIL,ZESTRIL) 20 MG tablet Take 1 tablet (20 mg total) by mouth 2 (two) times a day. 180 tablet 3     nitroglycerin (NITROSTAT) 0.4 MG SL tablet Place 1 tablet (0.4 mg total) under the tongue every 5 (five) minutes as needed for chest pain (pt has never used). 25 tablet 11     timolol (BETIMOL) 0.5 % ophthalmic solution Administer 1 drop into the left eye daily.       timolol maleate (TIMOPTIC) 0.5 % ophthalmic solution USE 1 DROP(S) IN LEFT EYE ONCE A DAY. 90 DAY SUPPLY  3     warfarin (COUMADIN/JANTOVEN) 4 MG tablet Takes 1/2 tablet (2mg) to 1 tablet (4mg) by mouth daily, as directed.  Will adjust dose based on INR results. 90 tablet 1     cholecalciferol, vitamin D3, (CHOLECALCIFEROL) 1,000 unit (25 mcg) tablet Take 1,000 Units by mouth at bedtime.        denosumab (PROLIA) 60 mg/mL Syrg Inject 60 mg under the skin every 6 (six) months. Outpatient Injection only.  Due around July 2014       Current Facility-Administered Medications on File Prior to Visit   Medication Dose Route Frequency Provider Last Rate Last Dose     [DISCONTINUED] denosumab 60 mg (PROLIA 60 mg/ml)  60 mg Subcutaneous Q6 Months Hank Austin MD   60 mg at 06/11/19 1000     [DISCONTINUED] denosumab 60 mg (PROLIA 60 mg/ml)  60 mg Subcutaneous Q6 Months Hank Austin MD

## 2021-06-05 NOTE — TELEPHONE ENCOUNTER
Who is calling:  Patients son     Reason for Call:  Calling to state : PCP ordered patient a Tens -Unit and they have no idea how to set it up. Please  Advise on assembly and placement.     Date of last appointment with primary care:   12/11/19    Okay to leave a detailed message: Yes

## 2021-06-05 NOTE — TELEPHONE ENCOUNTER
ANTICOAGULATION  MANAGEMENT    Assessment     Today's INR result of 2.8 is Therapeutic (goal INR of 2.0-3.0)        Warfarin taken as previously instructed    No new diet changes affecting INR    No new medication/supplements affecting INR    Continues to tolerate warfarin with no reported s/s of bleeding or thromboembolism     Previous INR was Supratherapeutic    Plan:     Spoke with Liz regarding INR result and instructed:     Warfarin Dosing Instructions:  Continue current warfarin dose 2 mg daily on Mon/Wed/Fri; and 4 mg daily rest of week  (0 % change)    Instructed patient to follow up no later than: two weeks    Education provided: importance of therapeutic range    Liz verbalizes understanding and agrees to warfarin dosing plan.    Instructed to call the [facility] for any changes, questions or concerns. (#[phone])   ?   Gayla Ayon RN    Subjective/Objective:      Liz Gonzalez, a 89 y.o. female is on warfarin.     Liz reports:     Home warfarin dose: as updated on anticoagulation calendar per template     Missed doses: No     Medication changes:  No     S/S of bleeding or thromboembolism:  No     New Injury or illness:  No     Changes in diet or alcohol consumption:  No     Upcoming surgery, procedure or cardioversion:  No    Anticoagulation Episode Summary     Current INR goal:   2.0-3.0   TTR:   60.6 % (1 y)   Next INR check:   1/27/2020   INR from last check:   2.80 (1/13/2020)   Weekly max warfarin dose:      Target end date:      INR check location:      Preferred lab:      Send INR reminders to:   [facility]    Indications    A-fib (H) (Resolved) [I48.91]           Comments:            Anticoagulation Care Providers     Provider Role Specialty Phone number    Natasha Nunez MD Referring Family Medicine [phone]    Hailey Naqvi FNP Responsible Nurse Practitioner [phone]

## 2021-06-05 NOTE — TELEPHONE ENCOUNTER
----- Message from Susan Rony sent at 1/15/2020  1:23 PM CST -----  General phone call:  PATIENT RECEIVED A 10'S DEVICE FOR ARTHRITIS PAIN, HOWEVER SHE IS CONCERNED AND WANTS TO MAKE SURE ITS OK TO USE CONSIDERING HER HEART CONDITION, PLEASE CALL  Caller: ALISA, DAUGHTER IN LAW  Primary cardiologist: DR DORADO  Detailed reason for call: SEE ABOVE  New or active symptoms? NO  Best phone number: [phone] CELL IS BEST  Best time to contact: ANY TIME  Ok to leave a detailed message? YES  Device? NO    Additional Info:

## 2021-06-05 NOTE — TELEPHONE ENCOUNTER
Who is calling:  patient  Reason for Call:  Patient was calling to go over INR results & directions with [facility].  She states she did not receive the voice mail.  Date of last appointment with primary care:   Okay to leave a detailed message: No

## 2021-06-05 NOTE — TELEPHONE ENCOUNTER
ANTICOAGULATION  MANAGEMENT    Assessment     Today's INR result of 5.1 is Supratherapeutic (goal INR of 2.0-3.0)        Warfarin taken as previously instructed    No new diet changes affecting INR    No new medication/supplements affecting INR    Continues to tolerate warfarin with no reported s/s of bleeding or thromboembolism     Previous INR was Therapeutic    Plan:     Spoke with Liz regarding INR result and instructed:     Warfarin Dosing Instructions:  hold warfarin today then change warfarin dose to 2 mg daily on Mon/Wed/Fri; and 4 mg daily rest of week  (15 % change)    Instructed patient to follow up no later than: one week    Education provided: importance of therapeutic range and target INR goal and significance of current INR result    Liz verbalizes understanding and agrees to warfarin dosing plan.    Instructed to call the [facility] for any changes, questions or concerns. (#[phone])   ?   Gayla Ayon RN    Subjective/Objective:      Liz Gonzalez, a 89 y.o. female is on warfarin.     Liz reports:     Home warfarin dose: as updated on anticoagulation calendar per template     Missed doses: No     Medication changes:  No     S/S of bleeding or thromboembolism:  No     New Injury or illness:  No     Changes in diet or alcohol consumption:  No     Upcoming surgery, procedure or cardioversion:  No    Anticoagulation Episode Summary     Current INR goal:   2.0-3.0   TTR:   62.4 % (1 y)   Next INR check:   1/13/2020   INR from last check:   5.10! (1/6/2020)   Weekly max warfarin dose:      Target end date:      INR check location:      Preferred lab:      Send INR reminders to:   [facility]    Indications    A-fib (H) (Resolved) [I48.91]           Comments:            Anticoagulation Care Providers     Provider Role Specialty Phone number    Natasha Nunez MD Referring Family Medicine [phone]    Hailey Naqvi FNP Responsible Nurse Practitioner [phone]

## 2021-06-05 NOTE — TELEPHONE ENCOUNTER
Reviewed instructions given to Violet via voicemail today. Violet verbalizes understanding to continue same dose and recheck INR in 4 weeks

## 2021-06-05 NOTE — TELEPHONE ENCOUNTER
Pt's Son Rogers notified at 2:25 pm, asked where the TENS unit was picked up- writer was informed it was received from [employer]. Informed him he should call [employer] and supplied the direct number so he can ask them how to set it up.  MARISSA RodriguezA

## 2021-06-05 NOTE — TELEPHONE ENCOUNTER
Called and spoke with daughter-in-law. Patient will possibly begin utilizing a TENS unit for her hip area. Informed daughter-in-law that it would be safe to utilize for the patient as it is a controlled use directly to the muscle area it is intended to help.  Review of contraindications for a TENS unit points out in relation to an implanted pacemaker, no other cardiac contra-indications listed. Daughter-in-law appreciative of the information. She is also looking for advisement as she raises concerns regarding the patients' skin with the use of the pads. Discussion to call or go to where it was picked up/bought and discuss. See if there is an alternate pad to use for frail skin, or other tricks recommended from the company. Mary appreciative of the call. She will follow-up with the device company for instruction and discussion on the pads for the TENS unit to protect her mother-in-laws skin. Appreciative of the call. CAPO,RN

## 2021-06-05 NOTE — TELEPHONE ENCOUNTER
ANTICOAGULATION  MANAGEMENT    Assessment     Today's INR result of 2.4 is Therapeutic (goal INR of 2.0-3.0)        Warfarin taken as previously instructed    No new diet changes affecting INR    No new medication/supplements affecting INR    Continues to tolerate warfarin with no reported s/s of bleeding or thromboembolism     Previous INR was Therapeutic    Plan:     Left a detailed message for Liz regarding INR result and instructed:     Warfarin Dosing Instructions:  Continue current warfarin dose 2 mg daily on Mon/Wed/Fri; and 4 mg daily rest of week  (0 % change)    Instructed patient to follow up no later than: 4 weeks    Education provided: importance of therapeutic range    Instructed to call the [facility] for any changes, questions or concerns. (#[phone])   ?   Gayla Ayon RN    Subjective/Objective:      Liz Gonzalez, a 89 y.o. female is on warfarin.     Liz reports:     Home warfarin dose: as updated on anticoagulation calendar per template     Missed doses: No     Medication changes:  No     S/S of bleeding or thromboembolism:  No     New Injury or illness:  No     Changes in diet or alcohol consumption:  No     Upcoming surgery, procedure or cardioversion:  No    Anticoagulation Episode Summary     Current INR goal:   2.0-3.0   TTR:   60.6 % (1 y)   Next INR check:   2/24/2020   INR from last check:   2.40 (1/27/2020)   Weekly max warfarin dose:      Target end date:      INR check location:      Preferred lab:      Send INR reminders to:   [facility]    Indications    A-fib (H) (Resolved) [I48.91]           Comments:            Anticoagulation Care Providers     Provider Role Specialty Phone number    Natasha Nunez MD Referring Family Medicine [phone]    Hailey Naqvi FNP Responsible Nurse Practitioner [phone]

## 2021-06-06 NOTE — PATIENT INSTRUCTIONS - HE
Increase tylenol ? Acetaminophen to 1,000 mg three times a day scheduled to get ahead of the pain.     In addition to the tylenol, start gabapentin 100 mg (1 capsule) at bedtime for one-two weeks. If pain persists, then increase it to 200 mg (2 capsules) at bedtime. If you continue to have pain after 1-2 weeks, you can increase it to 300 mg (3 capsules) at bedtime. If on 300 mg (3 capsules)  You can reach out to Dr. Storm to change the prescription to 300 mg capsules so that you are only taking 1 capsule a day. Stay at dose that is effective for pain.     An addiontal option, is to add an addition 100 mg in the morning and watch for sedation.    Please keep your walker at your bedside increase you wake up in the middle of the night to use the bathroom while using the gabapentin.     You can return to the spine clinic if further investigation into the cause of the pain is desired.   MRIs if needed.    Cardiology on Wednesday: Double check that your cardiologist is okay with the gabapentin.

## 2021-06-06 NOTE — TELEPHONE ENCOUNTER
Anticoagulation    Liz Gonzalez, 89 y.o., female    Chart reviewed for evaluation of next INR follow up date to limit exposure to health care setting during COVID-19 concern.    Lab Results   Component Value Date    INR 2.60 (H) 02/24/2020    INR 2.40 (H) 01/27/2020    INR 2.80 (H) 01/13/2020    INR 5.10 (H) 01/06/2020    INR 2.60 (H) 12/11/2019    INR 3.10 (H) 12/02/2019    INR 3.90 (H) 11/19/2019    INR 2.70 (H) 10/21/2019    INR 2.60 (H) 09/24/2019    INR 3.10 (H) 09/03/2019    INR 3.10 (!) 08/15/2019    INR 2.50 (H) 07/31/2019    INR 1.90 (H) 07/24/2019    INR 2.70 (!) 06/18/2018    INR 2.30 (!) 01/30/2017    INR 3.20 (!) 10/20/2016    INR 3.60 (!) 10/13/2016         Assessment/plan:    Last out of range INR 1/6/20; at 5.1; with no factors causing high. Responded well to dose reduction and has been therapeutic 2 weeks x 2 and 4 weeks x 1.    Reasonable to extend next INR 2 more weeks to 4/13. However would not recommend extending for prolonged duration due to more significant unexplained supratherapeutic in last 3 months.     Education provided      Importance of notifying clinic for diarrhea, nausea/vomiting, reduced intake, illness and/or medication changes.    Spoke to Mary. Shared recommendations. Mary inquired if car side testing an option. I informed her not at this time, but that we could discuss any changes in protocols closer to appointment date.  Scheduled for 4/14.    Jovanna Lovell, PharmD  Anticoagulation clinic

## 2021-06-06 NOTE — TELEPHONE ENCOUNTER
Mary calling in questioning if pt can extend next INR check out due to COVID-19 concerns.      Currently has appt 3/23/2020

## 2021-06-06 NOTE — TELEPHONE ENCOUNTER
ANTICOAGULATION  MANAGEMENT    Assessment     Today's INR result of 2.6 is Therapeutic (goal INR of 2.0-3.0)        Warfarin taken as previously instructed    No new diet changes affecting INR    No new medication/supplements affecting INR    Continues to tolerate warfarin with no reported s/s of bleeding or thromboembolism     Previous INR was Therapeutic    Plan:     Spoke with Liz regarding INR result and instructed:     Warfarin Dosing Instructions:  Continue current warfarin dose 2 mg daily on mon/wed/fri; and 4 mg daily rest of week  (0 % change)    Instructed patient to follow up no later than: one month      Violet verbalizes understanding and agrees to warfarin dosing plan.    Instructed to call the [facility] for any changes, questions or concerns. (#[phone])   ?   Candelaria Contreras RN    Subjective/Objective:      Liz Gonzalez, a 89 y.o. female is on warfarin.     Liz reports:     Home warfarin dose: as updated on anticoagulation calendar per template     Missed doses: No     Medication changes:  No     S/S of bleeding or thromboembolism:  No     New Injury or illness:  No     Changes in diet or alcohol consumption:  No     Upcoming surgery, procedure or cardioversion:  No    Anticoagulation Episode Summary     Current INR goal:   2.0-3.0   TTR:   65.5 % (1 y)   Next INR check:   3/23/2020   INR from last check:   2.60 (2/24/2020)   Weekly max warfarin dose:      Target end date:      INR check location:      Preferred lab:      Send INR reminders to:   [facility]    Indications    A-fib (H) (Resolved) [I48.91]           Comments:            Anticoagulation Care Providers     Provider Role Specialty Phone number    Natasha Nunez MD Referring Family Medicine [phone]    Hailey Nqavi FNP Responsible Nurse Practitioner [phone]      ANTICOAGULATION  MANAGEMENT    Assessment     Today's INR result of 2.6 is Therapeutic (goal INR of 2.0-3.0)        Warfarin taken as  previously instructed    No new diet changes affecting INR    No new medication/supplements affecting INR    Continues to tolerate warfarin with no reported s/s of bleeding or thromboembolism     Previous INR was Therapeutic    Plan:     Spoke with Liz regarding INR result and instructed:     Warfarin Dosing Instructions:  Continue current warfarin dose 2 mg daily on mon/wed/fri; and 4 mg daily rest of week      Instructed patient to follow up no later than: one month      Violet verbalizes understanding and agrees to warfarin dosing plan.    Instructed to call the [facility] for any changes, questions or concerns. (#[phone])   ?   Candelaria Contreras RN    Subjective/Objective:      Liz MACK Lisa, a 89 y.o. female is on warfarin.     Liz reports:     Home warfarin dose: as updated on anticoagulation calendar per template     Missed doses: No     Medication changes:  No     S/S of bleeding or thromboembolism:  No     New Injury or illness:  No     Changes in diet or alcohol consumption:  No     Upcoming surgery, procedure or cardioversion:  No    Anticoagulation Episode Summary     Current INR goal:   2.0-3.0   TTR:   65.5 % (1 y)   Next INR check:   3/23/2020   INR from last check:   2.60 (2/24/2020)   Weekly max warfarin dose:      Target end date:      INR check location:      Preferred lab:      Send INR reminders to:   [facility]    Indications    A-fib (H) (Resolved) [I48.91]           Comments:            Anticoagulation Care Providers     Provider Role Specialty Phone number    Natasha Nunez MD Referring Family Medicine [phone]    Hailey Naqvi FNP Responsible Nurse Practitioner [phone]

## 2021-06-06 NOTE — PATIENT INSTRUCTIONS - HE
Liz Gonzalez,    It was a pleasure to see you today at the [facility].     My recommendations after this visit include:    Same heart medications    SCOTT Schmidt MD, FACC, SUAD

## 2021-06-07 NOTE — TELEPHONE ENCOUNTER
ANTICOAGULATION  MANAGEMENT PROGRAM    Liz Gonzalez is being discharged from the [facility] ([facility]).    Reason for discharge:     [facility] referral closed, anticoagulation episode resolved and INR standing order discontinued.     Gayla Ayon RN

## 2021-06-07 NOTE — PROGRESS NOTES
[facility] Discharge Summary  Patient Name: Liz Gonzalez  Date: 4/7/2020  Referral Diagnosis:  Trochanteric bursitis of left hip [M70.62]  - Primary       Myofascial pain [M79.18]       Spondylolisthesis of lumbar region [M43.16]       Foraminal stenosis of lumbar region [M99.83]       Other idiopathic scoliosis, unspecified spinal region [M41.20]       Referring provider: Jasmeet Pardo, DO  Visit Diagnosis:   1. Greater trochanteric bursitis of both hips     2. Chronic bilateral low back pain without sciatica     3. Weakness of both hips     4. Spondylolisthesis of lumbar region         Goals:  Pt. will be independent with home exercise program in : 4 weeks Met  Pt. will report decreased intensity, frequency of : Pain;in 4 weeks Met  Patient will decrease : GUMARO score;by _ points;for improved quality of function;in 6 weeks  by ___ points: 30% Not Tested  Pt will: be able walk with less AD use and less pain, goal of 2/3 mile in 6 weeks: Met  Pt will: report going to her exercise class at her living facility, for improved activity level, in 4 weeks: Met    Patient was seen for 5 visits from 6/3/2019 to 7/24/2019 with - missed appointments.    The patient met goals and has demonstrated understanding of and independence in the home program for self-care, and progression to next steps.  She will initiate contact if questions or concerns arise.    Therapy will be discontinued at this time.  The patient will need a new referral to resume.    Thank you for your referral.  Ron GARCIA, PT  4/7/2020  10:27 AM

## 2021-06-07 NOTE — TELEPHONE ENCOUNTER
----- Message from Kaci Parnell sent at 4/21/2020 11:47 AM CDT -----  Regarding: Anticoagulation question  Caller: Mary (relative)    Primary cardiologist: Dr. Schmidt     Detailed reason for call: Mary states Violet is hospitalized at [facility] after a bad fall injury and they want to discontinue or adjust her anticoagulation medication but Mary doesn't want to make that decision and she is asking for recommendation from Dr. Schmidt. Please call back.    Best phone number: [phone]      Best time to contact: Today    Ok to leave a detailed message? Y    Device? N    Spoke with Violet's daughter in law, Mary who is listed on patient's consent to communicate form. Mary states that Violet had a bad fall and is currently admitted to [facility]. Mary states the hospitalist wishes to discontinue the anticoagulant for now and is calling asking for recommendations/advise. Advised Mary that due to the risk of subsequent falls and bleeding it would be wise to follow-through with the doctor's recommendations. Encouraged Violet and Mary to discuss concerns and questions with the hospitalist/staff currently taking care of patient. Mary is expecting a call from hospital staff today at 3 pm. Offered telephone visit with Dr. Schmidt once patient has been discharged to discuss anticoagulation further if desired. Encouraged her to return call if needed

## 2021-06-08 NOTE — PROGRESS NOTES
ASSESSMENT: Onychauxis, foot pain.    PLAN: Toenails were debrided manually and mechanically x10. Return to clinic in nine weeks.         SUBJECTIVE: Toenails are long, thick and painful. Last nail debridement in the clinic was October 4, 2016.     OBJECTIVE:  General: Pleasant 88 y.o. female in no acute distress.  Vascular: DP pulses are diminished. PT pulses are absent. Pedal hair is absent. Feet are cool to the touch.  Neuro: Sensation in the feet is grossly intact to light touch.  Derm:  Toenails are elongated, thickened and dystrophic with discoloration and subungual debris. Skin is thin and shiny but intact.   Musculoskeletal: Hammertoes.

## 2021-06-08 NOTE — PROGRESS NOTES
Assessment / Plan:     Diagnoses and all orders for this visit:    Lumbalgia  -     gabapentin (NEURONTIN) 100 MG capsule; Increase dose as directed by chart.  May increase to 1 tabs 3 times daily  Dispense: 180 capsule; Refill: 2  -     XR Lumbar Spine Flex and Ext 2 or 3 VWS; Future; Expected date: 1/5/17    Lumbar radiculitis  -     gabapentin (NEURONTIN) 100 MG capsule; Increase dose as directed by chart.  May increase to 1 tabs 3 times daily  Dispense: 180 capsule; Refill: 2  -     XR Lumbar Spine Flex and Ext 2 or 3 VWS; Future; Expected date: 1/5/17    Lumbar disc herniation  -     gabapentin (NEURONTIN) 100 MG capsule; Increase dose as directed by chart.  May increase to 1 tabs 3 times daily  Dispense: 180 capsule; Refill: 2  -     XR Lumbar Spine Flex and Ext 2 or 3 VWS; Future; Expected date: 1/5/17    Myofascial pain  -     gabapentin (NEURONTIN) 100 MG capsule; Increase dose as directed by chart.  May increase to 1 tabs 3 times daily  Dispense: 180 capsule; Refill: 2  -     XR Lumbar Spine Flex and Ext 2 or 3 VWS; Future; Expected date: 1/5/17    Sacroiliac dysfunction  -     gabapentin (NEURONTIN) 100 MG capsule; Increase dose as directed by chart.  May increase to 1 tabs 3 times daily  Dispense: 180 capsule; Refill: 2  -     XR Lumbar Spine Flex and Ext 2 or 3 VWS; Future; Expected date: 1/5/17    Sleep disturbance  -     gabapentin (NEURONTIN) 100 MG capsule; Increase dose as directed by chart.  May increase to 1 tabs 3 times daily  Dispense: 180 capsule; Refill: 2  -     XR Lumbar Spine Flex and Ext 2 or 3 VWS; Future; Expected date: 1/5/17    On warfarin therapy  -     gabapentin (NEURONTIN) 100 MG capsule; Increase dose as directed by chart.  May increase to 1 tabs 3 times daily  Dispense: 180 capsule; Refill: 2  -     XR Lumbar Spine Flex and Ext 2 or 3 VWS; Future; Expected date: 1/5/17          Liz Gonzalez is a 88 y.o. y.o. female with past medical history significant for hypernatremia,  osteoporosis, sciatica, congestive heart failure, restless leg syndrome, hypertension, atrial fibrillation, glaucoma, osteoarthritis,  who presents today for follow-up regarding bilateral back pain that is more pronounced on the left radiating to the left gluteal, left lateral thigh, and left knee.  Lumbar MRI done on January, 2, 2017 shows severe neural foraminal narrowing of the left L4-L5 as well as L2-L3.  Patient's symptoms are likely due to severe narrowing of the left L4-L5.  She also has spondylolisthesis at the L4-L5 that is contributing to her lower back pain.  I ordered flexion-extension x-rays to evaluate for laxity at the L4-L5, L2-L3, and T12-L1.  No red flags.     Lumbar MRI done on January 2, 2017 shows incidental finding of Numerous T2 hyperintense foci in the kidneys, poorly characterized given motion artifact but statistically represent cysts. Consider further characterization with ultrasound.  Patient was advised to follow-up with primary care provider for further evaluation.    A shared decision making plan was used.  The patient's values and choices were respected.  The following represents what was discussed and decided upon by the physician and the patient.      1.  DIAGNOSTIC TESTS:  I reviewed these lumbar MRI imaging and the report with the patient today.  I utilized the plastic spinal model 4 or discussion today.  Patient verbalizes understanding.  2.  PHYSICAL THERAPY: She will continue on home exercises.  3.  MEDICATIONS:  Gabapentin 100 mg to be taken one tablet 3 times a day with gradual increase of 1 tablet every 3 days as per provided chart.  Side effects of the medication discussed with the patient today.    4.  INTERVENTIONS:  I recommend left L4-L5 transforaminal epidural steroid injection.  Patient would like to hold off on the procedure for now and give it more thoughts.    5.  PATIENT EDUCATION:  I thoroughly discussed the plan with the patient today.  I would like her to get  the flexion-extension x-rays to evaluate for laxity at the L4-L5, L2-L3, T2-L1.  Patient verbalizes understanding and agrees with the plan.  6.  FOLLOW-UP: Patient will obtain the imaging and follow-up.  She will schedule a follow-up appointment.  All questions were answered.    Subjective:     Liz Gonzalez is a 88 y.o. female who presents today for follow-up regarding bilateral back pain.  Today patient still reports left-sided low back pain radiating to the left gluteal region, left lateral thigh and stops by the left knee.  Lumbar MRI done on January 2, 2017 shows a 7 mm anterolisthesis L4 and L5, especially millimeter retrolisthesis L2 and L3, there is also numerous subcentimeter T2 hyperintensities in the kidneys that could represents multiple cysts.  Ultrasound is recommended.  There is severe neural foraminal narrowing on the left L4-L5, L2-L3.  Today patient stated that the right-sided low back pain has improved however she still has the left-sided low back pain radiating to the left lower extremity.  She rates her pain at 5 out of 10 in intensity.  She has been taken Tylenol 500 mg 2-3 tablets daily.  She continues to do home exercises.Patient denies urinary or bowel incontinence, unintentional weight loss, saddle anesthesia, fever or chills, balance difficulties.      Review of Systems:  Left-sided low back.  Into the left lower extremity.Patient denies urinary or bowel incontinence, unintentional weight loss, saddle anesthesia, fever or chills, balance difficulties.       Objective:   CONSTITUTIONAL:  Vital signs as above.  No acute distress.  The patient is well nourished and well groomed.    PSYCHIATRIC:  The patient is awake, alert, oriented to person, place and time.  The patient is answering questions appropriately with clear speech.  Normal affect.  SKIN:  Skin over the face, posterior torso, bilateral upper and lower extremities is clean, dry, intact without rashes.  MUSCULOSKELETAL:  Gait is  non-antalgic.  The patient is able to heel and toe walk without any difficulty.  Mild tenderness over the left L4-L5 and L5-S1 lumbar paraspinal muscles.      The patient has 5/5 strength for the bilateral hip flexors, knee flexors/extensors, ankle dorsiflexors/plantar flexors, ankle evertors/invertors.    NEUROLOGICAL:  2/4 patellar, medial hamstring, achilles reflexes which are symmetric bilaterally.  No ankle clonus bilaterally.  Sensation to light touch is intact in the bilateral L4, L5, and S1 dermatomes.       RESULTS:      [facility]  MR LUMBAR SPINE WO CONTRAST  1/2/2017 1:39 PM     INDICATION: Low back pain, >6 wks / red flag(s) / radiculopathy  TECHNIQUE: Without  COMPARISON: None.     FINDINGS: Nomenclature is based on 5 lumbar type vertebral bodies.      Vertebral body heights maintained without definitive evidence of acute compression fracture. Type I Modic changes present at T12-L1, L2-L3, and L3-L4. Motion artifact degrades the exam. No definitive evidence of aggressive osseous lesion is identified.   Lumbar dextroscoliosis. 4 mm retrolisthesis L1 on L2. 6 mm retrolisthesis of L2 on L3. 7 mm anterolisthesis L4 and L5. Limited evaluation of the paravertebral musculature reveals atrophy. Numerous subcentimeter T2 hyperintensities in the kidneys are   poorly characterized on this exam given motion artifact but statistically represent cysts. Consider ultrasound for further characterization. Otherwise limited evaluation of the retroperitoneum is unremarkable. Mild degenerative changes in the SI joints.   Sacrum appears maintained. Conus ends at approximately L2. Nerve roots appear unremarkable.     T12-L1: Loss of disc height and T2 signal. Large right paracentral/foraminal extrusion with superior migration. This effaces the right lateral recess may impinge on the right T12 nerve root within the neural foramen. Contributes to a moderate severe   right neural foraminal narrowing. No overall canal  narrowing. Left neural foramen is patent. Mild degenerative facet disease.     L1-L2: Loss of disc height and T2 signal. Diffuse disc bulge/uncovering. Moderate facet hypertrophy. No canal narrowing. Moderate bilateral neural foraminal narrowing.     L2-L3: Loss of disc height and T2 signal. Diffuse disc bulge/uncovering. Suspect left paracentral protrusion. Severe left facet hypertrophy. No canal narrowing. Severe left mild right neural foraminal narrowing.     L3-L4: Loss of disc height and T2 signal. Diffuse disc bulge, asymmetric to the left. Moderate facet hypertrophy. Narrowing of the left lateral recess without overall canal narrowing. Moderate bilateral neural foraminal narrowing.     L4-L5: Loss of disc height and T2 signal. Disc uncovering. Severe facet hypertrophy. No canal narrowing. Severe left and moderate to severe right neural foraminal narrowing.     L5-S1: Disc height maintained, loss of T2 signal. Shallow central protrusion. No canal narrowing. Severe facet hypertrophy. Mild bilateral neural foraminal narrowing.     IMPRESSION:   1. Severe multilevel degenerative disc and facet disease superimposed on a lumbar extra scoliosis.   2. Large right paracentral/foraminal protrusion with superior migration at T12-L1. This may impinge on the right T12 nerve root within the neural foramen. This contributes to a moderate to severe neural foraminal narrowing on the right. No overall canal   narrowing.   3. Severe narrowing of the left lateral recess at L2-L3 and L3-L4 without overall canal narrowing.   4. Severe neural foraminal narrowing on the left at L2-L3 and L4-L5. Moderate severe narrowing on the right at L4-L5. Scattered areas of at least moderate neural foraminal narrowing elsewhere, as discussed.  5. Numerous T2 hyperintense foci in the kidneys, poorly characterized given motion artifact but statistically represent cysts. Consider further characterization with ultrasound.

## 2021-06-09 NOTE — PROGRESS NOTES
"Cardiology Progress Note    Assessment:  Chronic atrial fibrillation,  good rate control, on warfarin  Chronic diastolic heart failure, compensated, some fluid overload  Right heart failure and mild pulmonary hypertension secondary to chronic diastolic heart failure  History of stress cardiomyopathy, normalization of LV systolic performance  Coronary artery disease, mild, nonobstructive  COPD  Hypertension, good control      Plan:  BNP and BMP today to aid in assessment of fluid status.  I am not sure that there is much room to increase diuretics.  Continue current cardiac medications  Follow-up in 6 months    Subjective:   This is 88 y.o. female who comes in today for follow-up visit.  She continues to experience shortness of breath with exertion.  She has not had PND or orthopnea.  She does have mild swelling of lower extremities.  Her weight has not changed.  She denies heart palpitations or syncope.  She takes warfarin for chronic atrial fibrillation.  She will be moving to independent living facility in the near future.  Review of Systems:   General: WNL  Eyes: WNL  Ears/Nose/Throat: WNL  Lungs: WNL  Heart: Shortness of Breath with activity, Leg Swelling  Stomach: WNL  Bladder: WNL  Muscle/Joints: Joint Pain, Muscle Weakness  Skin: WNL  Nervous System: Loss of Balance  Mental Health: Anxiety     Blood: Easy Bruising    Objective:   Visit Vitals     /66 (Patient Site: Left Arm, Patient Position: Sitting, Cuff Size: Adult Regular)     Pulse 76     Resp 16     Ht 4' 10\" (1.473 m)     Wt 107 lb (48.5 kg)     BMI 22.36 kg/m2     Physical Exam:  GENERAL: no distress  NECK: No JVD  LUNGS: A few crackles at the bases  CARDIAC: irregular rhythm, S1 & S2 normal.  No heaves, thrills, gallops or murmurs.  ABDOMEN: flat, negative hepatosplenomegaly, soft and non-tender.  EXTREMITIES: No evidence of cyanosis, clubbing.  Trace edema.    Current Outpatient Prescriptions   Medication Sig Dispense Refill     acetaminophen " (TYLENOL) 325 MG tablet Take 650 mg by mouth 3 (three) times a day.       ANTIOX#10/OM3/DHA/EPA/LUT/ZEAX (I-CAPS ORAL) Take 1 tablet by mouth 2 (two) times a day.       CALCIUM CARBONATE/VITAMIN D3 (CALTRATE 600 + D ORAL) Take 1 tablet by mouth daily.       carvedilol (COREG) 25 MG tablet TAKE ONE TABLET BY MOUTH TWICE A DAY WITH MEALS 180 tablet 2     cholecalciferol, vitamin D3, (CHOLECALCIFEROL) 1,000 unit tablet Take 1,000 Units by mouth bedtime.        denosumab (PROLIA) 60 mg/mL Syrg Inject 60 mg under the skin every 6 (six) months. Outpatient Injection only.  Due around July 2014       FOLIC ACID/MV,FE,OTHER MIN (CENTRUM ORAL) Take 1 tablet by mouth daily.       furosemide (LASIX) 40 MG tablet TAKE ONE TABLET BY MOUTH TWICE A DAY AT 9AM AND 6PM 180 tablet 2     gabapentin (NEURONTIN) 100 MG capsule Increase dose as directed by chart.  May increase to 1 tabs 3 times daily 180 capsule 2     glucosamine-chondroitin 500-400 mg tablet Take 1 tablet by mouth 2 (two) times a day.       latanoprost (XALATAN) 0.005 % ophthalmic solution Administer 1 drop to both eyes bedtime.       lisinopril (PRINIVIL,ZESTRIL) 20 MG tablet TAKE ONE TABLET BY MOUTH TWICE A  tablet 2     nitroglycerin (NITROSTAT) 0.4 MG SL tablet Place 1 tablet (0.4 mg total) under the tongue every 5 (five) minutes as needed for chest pain (pt has never used). 25 tablet 11     timolol (BETIMOL) 0.5 % ophthalmic solution Administer 1 drop into the left eye daily.       warfarin (COUMADIN) 4 MG tablet Take 4 mg by mouth daily.       warfarin (COUMADIN) 4 MG tablet TAKE ONE TABLET BY MOUTH EVERY DAY OR AS DIRECTED 90 tablet 1     Current Facility-Administered Medications   Medication Dose Route Frequency Provider Last Rate Last Dose     denosumab 60 mg (PROLIA 60 mg/ml)  60 mg Subcutaneous Q6 Months Uyen Thomas MD   60 mg at 04/29/16 1114       Cardiographics:    Echocardiogram: April 2016 Normal left ventricular size.  Normal left  ventricular wall thickness.  Left ventricular ejection fraction is visually estimated to be 55%.  No regional wall motion abnormalities.  Moderately enlarged right ventricle.  Right ventricular systolic function is mildly reduced.  Estimated right ventricular systolic pressure is 50 mmHg.  Severely enlarged left atrium.  Dilated IVC with reduced inspiratory collapse is consistent with elevated  right atrial pressure.  This study was compared with a previously done echocardiogram 11/13/14.  The results are similar.    Lab Results:       Lab Results   Component Value Date    CHOL 133 05/11/2010     Lab Results   Component Value Date    HDL 44 05/11/2010     Lab Results   Component Value Date    LDLCALC 68 05/11/2010     Lab Results   Component Value Date    TRIG 104 05/11/2010     No components found for: CHOLHDL  BNP   Date Value Ref Range Status   05/16/2016 583 (H) 0 - 167 pg/mL Final       Galdino (Mk)  MD [place]

## 2021-06-09 NOTE — PROGRESS NOTES
Assessment / Plan:     Diagnoses and all orders for this visit:    Low back pain    Lumbar radicular pain    On warfarin therapy    Lumbalgia    Lumbar radiculitis    Lumbar disc herniation    Myofascial pain          Liz Gonzalez is a 88 y.o. y.o. female with past medical history significant for hypernatremia, osteoporosis, sciatica, congestive heart failure, restless leg syndrome, hypertension, atrial fibrillation, glaucoma, osteoarthritis, who presents today for follow-up regarding bilateral back pain that is more pronounced on the left radiating to the left gluteal, left lateral thigh, and left knee.  Today the patient reports complete resolution of the radicular pain into the lower extremity after receiving left L4-L5 transforaminal epidural steroid injection on January 30, 2017.  She reports scalp  itchiness after starting on gabapentin for 2 weeks.  I recommend patient to start to wean off gabapentin gradually since she developed side effects from it.  I would like patient to continue on home physical exercises, walking, stretching and applying heat in the morning.  She may continue with Tylenol when necessary for mild to moderate pain.    A shared decision making plan was used.  The patient's values and choices were respected.  The following represents what was discussed and decided upon by the physician and the patient.      1.  DIAGNOSTIC TESTS:  No imaging to review today.  2.  PHYSICAL THERAPY: She will continue on home exercises.  3.  MEDICATIONS:  She will start weaning off gabapentin 100 mg 1 tablet every week until complete discontinuation.  If she develops reappearance of the radicular pain I recommend she goes back on 1 tablet at nighttime.    4.  INTERVENTIONS: No therapeutic injections at this time.    5.  PATIENT EDUCATION:  I thoroughly discussed the plan with the patient today.  She verbalizes understanding and agrees with the plan.  6.  FOLLOW-UP: She will follow up on an as-needed  basis.  I encouraged patient to call for any questions or concerns.  All questions were answered.    Subjective:     Liz Gonzalez is a 88 y.o. female who presents today for follow-up regarding bilateral low back pain, and  left-sided low back pain radiating to the left gluteal region, left lateral thigh and stops by the left knee.  Today the patient reports complete resolution of the radicular pain into the lower extremity after receiving left L4-L5 transforaminal epidural steroid injection on January 30, 2017.  She still has mild low back pain that improves with walking, stretching and exercises, and taking Tylenol.  She is still taking gabapentin 100 mg 1 tablet 3 times a day, Tylenol when necessary for mild pain.  Patient stated that she developed itchiness of her scalp after 2 weeks of starting on gabapentin.  She would like to keep discontinue gabapentin.  Patient denies radicular pain bilateral lower extremity anymore.Patient denies urinary or bowel incontinence, unintentional weight loss, saddle anesthesia, fever or chills, balance difficulties.      Review of Systems:  Mild low back pain.Patient denies urinary or bowel incontinence, unintentional weight loss, saddle anesthesia, fever or chills, balance difficulties.       Objective:   CONSTITUTIONAL:  Vital signs as above.  No acute distress.  The patient is well nourished and well groomed.    PSYCHIATRIC:  The patient is awake, alert, oriented to person, place and time.  The patient is answering questions appropriately with clear speech.  Normal affect.  SKIN:  Skin over the face, posterior torso, bilateral upper and lower extremities is clean, dry, intact without rashes.  MUSCULOSKELETAL:  Gait is non-antalgic.  The patient is able to heel and toe walk without any difficulty.  Mild tenderness over the left L4-L5, L5-S1 lumbar paraspinal muscles.      The patient has 5/5 strength for the bilateral hip flexors, knee flexors/extensors, ankle  dorsiflexors/plantar flexors, ankle evertors/invertors.    NEUROLOGICAL:  2/4 patellar, medial hamstring, achilles reflexes which are symmetric bilaterally.  No ankle clonus bilaterally.  Sensation to light touch is intact in the bilateral L4, L5, and S1 dermatomes.       RESULTS:      [facility]  MR LUMBAR SPINE WO CONTRAST  1/2/2017 1:39 PM      INDICATION: Low back pain, >6 wks / red flag(s) / radiculopathy  TECHNIQUE: Without  COMPARISON: None.      FINDINGS: Nomenclature is based on 5 lumbar type vertebral bodies.       Vertebral body heights maintained without definitive evidence of acute compression fracture. Type I Modic changes present at T12-L1, L2-L3, and L3-L4. Motion artifact degrades the exam. No definitive evidence of aggressive osseous lesion is identified.   Lumbar dextroscoliosis. 4 mm retrolisthesis L1 on L2. 6 mm retrolisthesis of L2 on L3. 7 mm anterolisthesis L4 and L5. Limited evaluation of the paravertebral musculature reveals atrophy. Numerous subcentimeter T2 hyperintensities in the kidneys are   poorly characterized on this exam given motion artifact but statistically represent cysts. Consider ultrasound for further characterization. Otherwise limited evaluation of the retroperitoneum is unremarkable. Mild degenerative changes in the SI joints.   Sacrum appears maintained. Conus ends at approximately L2. Nerve roots appear unremarkable.      T12-L1: Loss of disc height and T2 signal. Large right paracentral/foraminal extrusion with superior migration. This effaces the right lateral recess may impinge on the right T12 nerve root within the neural foramen. Contributes to a moderate severe   right neural foraminal narrowing. No overall canal narrowing. Left neural foramen is patent. Mild degenerative facet disease.      L1-L2: Loss of disc height and T2 signal. Diffuse disc bulge/uncovering. Moderate facet hypertrophy. No canal narrowing. Moderate bilateral neural foraminal  narrowing.      L2-L3: Loss of disc height and T2 signal. Diffuse disc bulge/uncovering. Suspect left paracentral protrusion. Severe left facet hypertrophy. No canal narrowing. Severe left mild right neural foraminal narrowing.      L3-L4: Loss of disc height and T2 signal. Diffuse disc bulge, asymmetric to the left. Moderate facet hypertrophy. Narrowing of the left lateral recess without overall canal narrowing. Moderate bilateral neural foraminal narrowing.      L4-L5: Loss of disc height and T2 signal. Disc uncovering. Severe facet hypertrophy. No canal narrowing. Severe left and moderate to severe right neural foraminal narrowing.      L5-S1: Disc height maintained, loss of T2 signal. Shallow central protrusion. No canal narrowing. Severe facet hypertrophy. Mild bilateral neural foraminal narrowing.      IMPRESSION:   1. Severe multilevel degenerative disc and facet disease superimposed on a lumbar extra scoliosis.   2. Large right paracentral/foraminal protrusion with superior migration at T12-L1. This may impinge on the right T12 nerve root within the neural foramen. This contributes to a moderate to severe neural foraminal narrowing on the right. No overall canal   narrowing.   3. Severe narrowing of the left lateral recess at L2-L3 and L3-L4 without overall canal narrowing.   4. Severe neural foraminal narrowing on the left at L2-L3 and L4-L5. Moderate severe narrowing on the right at L4-L5. Scattered areas of at least moderate neural foraminal narrowing elsewhere, as discussed.  5. Numerous T2 hyperintense foci in the kidneys, poorly characterized given motion artifact but statistically represent cysts. Consider further characterization with ultrasound.

## 2021-06-10 NOTE — PROGRESS NOTES
1. Osteoporosis  DXA Bone Density Scan    denosumab 60 mg (PROLIA 60 mg/ml)       Return in about 6 months (around 11/18/2017) for Recheck.    Patient Instructions   Prolia 14th today.  Prolia 15th in 6 months with my nurse. I will see you in 1 year.  DXA in 10/2017 .   Phone number to schedule [phone].  Please avoid any extensive dental work as implants and teeth extractions for the next 1-2 months.  Daily calcium need is 8185-7759 mg a day from the diet and supplements.  Calcium citrate is easier to digest.  Vitamin D 2000 IU daily recommended.      Chief Complaint   Patient presents with     Osteoporosis Follow Up       /60  Pulse 76  Wt 101 lb 9.6 oz (46.1 kg)  BMI 21.23 kg/m2      Did you experience any problems with previous Prolia injection? no  Any medication change in the last 6 months? no  Did you take prednisone or other immunosupressant drugs in the last 6 months   (chemo, transplant, rheum, dermatology conditions)? no  Did you have any serious infection in the last 6 months?no  Any recent hospitalizations?no  Do you plan any dental work in the next 2-3 months?no  How much calcium do you take daily from the diet and supplements?1200 mg  How much vit D do you take daily? 2000 IU  Last DXA? 10/2015      Patient is here today for the 14th Prolia injection. Patient tolerated previous injections well.   We discussed calcium and vit D daily needs today.   Next Prolia injection will be in 6 months.     15 minutes spent with the patient and more then 50 % of the time in counseling.  This note has been dictated using voice recognition software. Any grammatical or context distortions are unintentional and inherent to the software      Patient Active Problem List   Diagnosis     Hyponatremia     Squamous Cell Carcinoma Of The Lung     Cataract     Osteoporosis     Sciatica     Congestive Heart Failure     Restless Legs Syndrome     Essential Hypertension     Atrial Fibrillation     Glaucoma      Osteoarthritis     Coronary Artery Disease     Joint Pain, Localized In The Hip     Shortness Of Breath     Lumbar stenosis with neurogenic claudication     A-fib     Edema       Current Outpatient Prescriptions on File Prior to Visit   Medication Sig Dispense Refill     acetaminophen (TYLENOL) 325 MG tablet Take 650 mg by mouth 3 (three) times a day.       ANTIOX#10/OM3/DHA/EPA/LUT/ZEAX (I-CAPS ORAL) Take 1 tablet by mouth 2 (two) times a day.       CALCIUM CARBONATE/VITAMIN D3 (CALTRATE 600 + D ORAL) Take 1 tablet by mouth daily.       carvedilol (COREG) 25 MG tablet TAKE ONE TABLET BY MOUTH TWICE A DAY WITH MEALS 180 tablet 2     cholecalciferol, vitamin D3, (CHOLECALCIFEROL) 1,000 unit tablet Take 1,000 Units by mouth bedtime.        denosumab (PROLIA) 60 mg/mL Syrg Inject 60 mg under the skin every 6 (six) months. Outpatient Injection only.  Due around July 2014       diltiazem (CARDIZEM CD) 120 MG 24 hr capsule Take 1 capsule (120 mg total) by mouth daily. 30 capsule 3     FOLIC ACID/MV,FE,OTHER MIN (CENTRUM ORAL) Take 1 tablet by mouth daily.       furosemide (LASIX) 40 MG tablet TAKE ONE TABLET BY MOUTH TWICE A DAY AT 9AM AND 6PM (Patient taking differently: TAKE ONE TABLET BY MOUTH DAILY) 180 tablet 2     gabapentin (NEURONTIN) 100 MG capsule Increase dose as directed by chart.  May increase to 1 tabs 3 times daily 180 capsule 2     glucosamine-chondroitin 500-400 mg tablet Take 1 tablet by mouth 2 (two) times a day.       latanoprost (XALATAN) 0.005 % ophthalmic solution Administer 1 drop to both eyes bedtime.       lisinopril (PRINIVIL,ZESTRIL) 20 MG tablet TAKE ONE TABLET BY MOUTH TWICE A  tablet 2     nitroglycerin (NITROSTAT) 0.4 MG SL tablet Place 1 tablet (0.4 mg total) under the tongue every 5 (five) minutes as needed for chest pain (pt has never used). 25 tablet 11     timolol (BETIMOL) 0.5 % ophthalmic solution Administer 1 drop into the left eye daily.       warfarin (COUMADIN) 4 MG tablet  Take 4 mg by mouth daily.       warfarin (COUMADIN) 4 MG tablet TAKE ONE TABLET BY MOUTH EVERY DAY OR AS DIRECTED 90 tablet 1     Current Facility-Administered Medications on File Prior to Visit   Medication Dose Route Frequency Provider Last Rate Last Dose     [DISCONTINUED] denosumab 60 mg (PROLIA 60 mg/ml)  60 mg Subcutaneous Q6 Months yUen Thomas MD   60 mg at 04/29/16 1116

## 2021-06-11 NOTE — PROGRESS NOTES
ASSESSMENT: Onychauxis, foot pain.    PLAN: Toenails were debrided manually and mechanically x10. Return to clinic in nine weeks.         SUBJECTIVE: Toenails are long, thick and painful. Last nail debridement in the clinic was February 7, 2017.     OBJECTIVE:  General: Pleasant 89 y.o. female in no acute distress.  Vascular: DP pulses are diminished. PT pulses are absent. Pedal hair is absent. Feet are cool to the touch.  Neuro: Sensation in the feet is grossly intact to light touch.  Derm:  Toenails are elongated, thickened and dystrophic with discoloration and subungual debris. Skin is thin and shiny but intact.   Musculoskeletal: Hammertoes.

## 2021-06-12 NOTE — PROGRESS NOTES
Assessment & Plan:  1. Dry scalp       Admitted that patient talk with her hairstylist and see what kind of chronic she uses a first step.  She can use over-the-counter products for now such as Selsun Blue, T-Gel shampoo to remove residue and did recommend for the next couple weeks potentially more frequent washing of the hair.  Buildup appears green residue could be contributing to symptoms.  She can also use over-the-counter Scalpicin to relieve symptoms.  Follow-up if not improving.  No lesions of the scalp.  There are no Patient Instructions on file for this visit.    No orders of the defined types were placed in this encounter.    Medications Discontinued During This Encounter   Medication Reason     warfarin (COUMADIN) 4 MG tablet Duplicate order               Chief Complaint:   Chief Complaint   Patient presents with     Hair/Scalp Problem     c/o itchy head at night x 3 months,         History of Present Illness:  Liz is a 89 y.o. female presenting to the clinic today with right side scalp itch for 3 months or so. Might be spreading now. Hair done every 2 weeks. She asked and is not sure if products have changed.  She recently did change her hair style and approximately 3 months ago as well, and was now washes her hair as previously she was washing herself prior to her appointment.  She will check with her stylus to see if anything is changed.  She does not watch her here in between styling debris 2 weeks.  She does use hairspray and so wonders if she gets some product buildup.  She does not notice any lesions or extra dandruff or residue she scratches her head.  No open sores or wounds that she is noticed.  No known seasonal allergies could be contributing.  Nothing tried at home.    Review of Systems:  All other systems are negative except as noted above.    PFSH:  Reviewed and updated.    Tobacco Use:  History   Smoking Status     Former Smoker     Packs/day: 0.25     Years: 50.00     Quit date:  6/19/2010   Smokeless Tobacco     Not on file       Vitals:  Vitals:    09/12/17 1056   BP: 102/52   Patient Site: Right Arm   Patient Position: Sitting   Cuff Size: Adult Regular   Pulse: 70   Resp: 14   Temp: 97.9  F (36.6  C)   TempSrc: Oral   Weight: 102 lb 9 oz (46.5 kg)     Wt Readings from Last 3 Encounters:   09/12/17 102 lb 9 oz (46.5 kg)   07/18/17 101 lb (45.8 kg)   05/18/17 101 lb 9.6 oz (46.1 kg)       Physical Exam:  Constitutional:  Reveals an alert, cooperative, 89-year-old female in no acute distress.  Vitals:  Per nursing notes.  Skin:   Scalp appears dry, otherwise free of lesion, no dandruff.  Psychiatric:  Mood appropriate, memory intact.     Data Reviewed:  Additional History from Old Records or Another Person Summarized (2 total): None.     Decision to Obtain Extra information (1 total): None.     Radiology Tests Summarized and Ordered (XRAY/CT/MRI/DXA) (1 total): None.    Labs Reviewed and Ordered (1 total): None.    Medicine Tests Summarized and Ordered (EKG/ECHO/COLONOSCOPY/EGD) (1 total): None.    Independent Review of EKG or X-Ray (2 each): None.    The visit lasted a total of 20 minutes face to face with the patient. Over 50% of the time was spent counseling and educating the patient about POC.    Medications:  Current Outpatient Prescriptions   Medication Sig Dispense Refill     ANTIOX#10/OM3/DHA/EPA/LUT/ZEAX (I-CAPS ORAL) Take 1 tablet by mouth 2 (two) times a day.       CALCIUM CARBONATE/VITAMIN D3 (CALTRATE 600 + D ORAL) Take 1 tablet by mouth daily.       carvedilol (COREG) 25 MG tablet TAKE ONE TABLET BY MOUTH TWICE A DAY WITH MEALS 180 tablet 2     cholecalciferol, vitamin D3, (CHOLECALCIFEROL) 1,000 unit tablet Take 1,000 Units by mouth bedtime.        denosumab (PROLIA) 60 mg/mL Syrg Inject 60 mg under the skin every 6 (six) months. Outpatient Injection only.  Due around July 2014       diltiazem (CARDIZEM CD) 120 MG 24 hr capsule Take 2 capsules (240 mg total) by mouth daily.  90 capsule 1     FOLIC ACID/MV,FE,OTHER MIN (CENTRUM ORAL) Take 1 tablet by mouth daily.       furosemide (LASIX) 40 MG tablet TAKE ONE TABLET BY MOUTH TWICE A DAY AT 9 AM. AND 6 PM. 180 tablet 2     glucosamine-chondroitin 500-400 mg tablet Take 1 tablet by mouth 2 (two) times a day.       latanoprost (XALATAN) 0.005 % ophthalmic solution Administer 1 drop to both eyes bedtime.       lisinopril (PRINIVIL,ZESTRIL) 20 MG tablet TAKE ONE TABLET BY MOUTH TWICE A  tablet 2     timolol (BETIMOL) 0.5 % ophthalmic solution Administer 1 drop into the left eye daily.       warfarin (COUMADIN) 4 MG tablet Take one half - one tablet ( 2 - 4 mg ) daily as directed. Adjust dose per INR results as instructed. 90 tablet 1     acetaminophen (TYLENOL) 325 MG tablet Take 650 mg by mouth 3 (three) times a day.       gabapentin (NEURONTIN) 100 MG capsule Increase dose as directed by chart.  May increase to 1 tabs 3 times daily 180 capsule 2     nitroglycerin (NITROSTAT) 0.4 MG SL tablet Place 1 tablet (0.4 mg total) under the tongue every 5 (five) minutes as needed for chest pain (pt has never used). 25 tablet 11     No current facility-administered medications for this visit.        Total Data Points:     ASHWINI Stevens, CNP    This note has been dictated using voice recognition software. Any grammatical or context distortions are unintentional and inherent to the software

## 2021-06-13 NOTE — PROGRESS NOTES
ASSESSMENT: Onychauxis, foot pain.    PLAN: Toenails were debrided manually and mechanically x10. Return to clinic in nine weeks.         SUBJECTIVE: Toenails are long, thick and painful. Last nail debridement in the clinic was July 18, 2017.     OBJECTIVE:  General: Pleasant 89 y.o. female in no acute distress.  Vascular: DP pulses are diminished. PT pulses are absent. Pedal hair is absent. Feet are cool to the touch.  Neuro: Sensation in the feet is grossly intact to light touch.  Derm:  Toenails are elongated, thickened and dystrophic with discoloration and subungual debris. Skin is thin and shiny but intact.   Musculoskeletal: Hammertoes.

## 2021-06-14 NOTE — PROGRESS NOTES
Chief Complaint   Patient presents with     PROLIA #15     1. Did you experience any problems with previous Prolia injection? NO  2. Do you feel sick today?(fever, RS, GI,  issues)? NO  3. Any medication changes in the last 6 months? NO  4. Did you take prednisone or other immunosuppressant drugs in the last 6 months?(chemo, transplant, rheu, dermatology conditions)? NO  5. Did you have any serious infection in the last 6 months? (pancreatitis) NO  6. Any recent hospitalizations/ surgeries (especially gastric bypass, thyroid, parathyroid)? NO  7. Do you plan any dental work?(especially implants and extractions) in the next 2-3 months? NO  8. Did you have any fractures in the last year? NO    Informed patient schedule for next 6 months Prolia injection with Visekruna and Dexa will be due as well . Patient verbalized understanding     Pamela Denise CMA [facility] 11/20/2017 11:32 AM

## 2021-06-14 NOTE — PROGRESS NOTES
"Cardiology Progress Note    Assessment:  Chronic atrial fibrillation, satisfactory rate control, on warfarin  Chronic diastolic heart failure, compensated  Right heart failure and mild pulmonary hypertension secondary to chronic diastolic heart failure  History of stress cardiomyopathy, normalization of LV systolic performance  Coronary artery disease, mild, nonobstructive  COPD  Hypertension, good control      Plan:  Continue current cardiac medications  Follow-up in 6 months    Subjective:   This is 89 y.o. female who comes in today for follow-up visit.  She has done well over the course of last several months.  She denies increasing chest pain or shortness of breath.  She has not had heart palpitations or syncope.  She goes for daily walks.  She thinks that she covers about a mile.  Her weight has been stable.  In the spring of this year she had a Holter monitor which showed elevated ventricular response rate.  She was seen by EP physician.  He recommended addition of diltiazem to carvedilol.    Review of Systems:   General: WNL  Eyes: WNL  Ears/Nose/Throat: WNL  Lungs: WNL  Heart: WNL  Stomach: WNL  Bladder: WNL  Muscle/Joints: WNL  Skin: WNL  Nervous System: WNL  Mental Health: WNL     Blood: WNL    Objective:   /66 (Patient Site: Left Arm, Patient Position: Sitting, Cuff Size: Adult Regular)  Pulse 100  Resp 16  Ht 4' 10\" (1.473 m)  Wt 103 lb (46.7 kg)  BMI 21.53 kg/m2  Physical Exam:  GENERAL: no distress  NECK: No JVD  LUNGS: Clear to auscultation.  CARDIAC: irregular rhythm, S1 & S2 normal.  No heaves, thrills, gallops or murmurs.  ABDOMEN: flat, negative hepatosplenomegaly, soft and non-tender.  EXTREMITIES: No evidence of cyanosis, clubbing or edema.    Current Outpatient Prescriptions   Medication Sig Dispense Refill     acetaminophen (TYLENOL) 325 MG tablet Take 650 mg by mouth as needed.        ANTIOX#10/OM3/DHA/EPA/LUT/ZEAX (I-CAPS ORAL) Take 1 tablet by mouth 2 (two) times a day.       " CALCIUM CARBONATE/VITAMIN D3 (CALTRATE 600 + D ORAL) Take 1 tablet by mouth daily.       carvedilol (COREG) 25 MG tablet TAKE ONE TABLET BY MOUTH TWICE A DAY WITH MEALS 180 tablet 2     cholecalciferol, vitamin D3, (CHOLECALCIFEROL) 1,000 unit tablet Take 1,000 Units by mouth bedtime.        denosumab (PROLIA) 60 mg/mL Syrg Inject 60 mg under the skin every 6 (six) months. Outpatient Injection only.  Due around July 2014       diltiazem (CARDIZEM CD) 120 MG 24 hr capsule Take 2 capsules (240 mg total) by mouth daily. 90 capsule 1     FOLIC ACID/MV,FE,OTHER MIN (CENTRUM ORAL) Take 1 tablet by mouth daily.       furosemide (LASIX) 40 MG tablet TAKE ONE TABLET BY MOUTH TWICE A DAY AT 9 AM. AND 6 PM. 180 tablet 2     glucosamine-chondroitin 500-400 mg tablet Take 1 tablet by mouth 2 (two) times a day.       latanoprost (XALATAN) 0.005 % ophthalmic solution Administer 1 drop to both eyes bedtime.       lisinopril (PRINIVIL,ZESTRIL) 20 MG tablet TAKE ONE TABLET BY MOUTH TWICE A  tablet 2     nitroglycerin (NITROSTAT) 0.4 MG SL tablet Place 1 tablet (0.4 mg total) under the tongue every 5 (five) minutes as needed for chest pain (pt has never used). 25 tablet 11     timolol (BETIMOL) 0.5 % ophthalmic solution Administer 1 drop into the left eye daily.       warfarin (COUMADIN) 4 MG tablet Take one half - one tablet ( 2 - 4 mg ) daily as directed. Adjust dose per INR results as instructed. 90 tablet 1     gabapentin (NEURONTIN) 100 MG capsule Increase dose as directed by chart.  May increase to 1 tabs 3 times daily 180 capsule 2     timolol maleate (TIMOPTIC) 0.5 % ophthalmic solution        No current facility-administered medications for this visit.        Cardiographics:    Echocardiogram: April 2016   Normal left ventricular size.  Normal left ventricular wall thickness.  Left ventricular ejection fraction is visually estimated to be 55%.  No regional wall motion abnormalities.  Moderately enlarged right  ventricle.  Right ventricular systolic function is mildly reduced.  Estimated right ventricular systolic pressure is 50 mmHg.  Severely enlarged left atrium.  Dilated IVC with reduced inspiratory collapse is consistent with elevated  right atrial pressure.  This study was compared with a previously done echocardiogram 11/13/14.  The results are similar.    Lab Results:       Lab Results   Component Value Date    CHOL 133 05/11/2010     Lab Results   Component Value Date    HDL 44 05/11/2010     Lab Results   Component Value Date    LDLCALC 68 05/11/2010     Lab Results   Component Value Date    TRIG 104 05/11/2010     No components found for: CHOLHDL  BNP   Date Value Ref Range Status   03/10/2017 749 (H) 0 - 167 pg/mL Final       Galdino (Mk)  MD Roxana

## 2021-06-15 NOTE — PROGRESS NOTES
ASSESSMENT: Onychauxis, foot pain.    PLAN: Toenails were debrided manually and mechanically x10. Return to clinic in nine weeks.         SUBJECTIVE: Toenails are long, thick and painful. Last nail debridement in the clinic was October 31, 2017.     OBJECTIVE:  General: Pleasant 89 y.o. female in no acute distress.  Vascular: DP pulses are diminished. PT pulses are absent. Pedal hair is absent. Feet are cool to the touch.  Neuro: Sensation in the feet is grossly intact to light touch.  Derm:  Toenails are elongated, thickened and dystrophic with discoloration and subungual debris. Skin is thin and shiny but intact.   Musculoskeletal: Hammertoes.

## 2021-06-16 PROBLEM — R62.7 FAILURE TO THRIVE IN ADULT: Status: ACTIVE | Noted: 2020-01-01

## 2021-06-16 PROBLEM — E44.0 MODERATE PROTEIN-CALORIE MALNUTRITION (H): Status: ACTIVE | Noted: 2020-01-01

## 2021-06-16 PROBLEM — E87.5 HYPERKALEMIA: Status: ACTIVE | Noted: 2020-01-01

## 2021-06-16 PROBLEM — T79.6XXA TRAUMATIC RHABDOMYOLYSIS, INITIAL ENCOUNTER (H): Status: ACTIVE | Noted: 2020-01-01

## 2021-06-16 PROBLEM — Z66 DNR (DO NOT RESUSCITATE): Status: ACTIVE | Noted: 2020-01-01

## 2021-06-16 PROBLEM — I07.1 SEVERE TRICUSPID REGURGITATION: Chronic | Status: ACTIVE | Noted: 2020-01-01
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
## 2021-06-16 PROBLEM — I25.5 ISCHEMIC CARDIOMYOPATHY: Status: ACTIVE | Noted: 2020-01-01

## 2021-06-16 PROBLEM — I50.810 RIGHT VENTRICULAR FAILURE (H): Chronic | Status: ACTIVE | Noted: 2020-01-01

## 2021-06-16 NOTE — TELEPHONE ENCOUNTER
Telephone Encounter by Gayla Ayon RN at 12/11/2019  3:20 PM     Author: Gayla Ayon RN Service: -- Author Type: Registered Nurse    Filed: 12/11/2019  3:26 PM Encounter Date: 12/11/2019 Status: Attested    : Gayla Ayon RN (Registered Nurse) Cosigner: Jean Redd MD at 12/11/2019  4:10 PM    Attestation signed by Jean Redd MD at 12/11/2019  4:10 PM                      ANTICOAGULATION  MANAGEMENT    Assessment     Today's INR result of 2.6 is Supratherapeutic (goal INR of 2.0-3.0)        Warfarin taken as previously instructed    No new diet changes affecting INR    No new medication/supplements affecting INR    Continues to tolerate warfarin with no reported s/s of bleeding or thromboembolism     Previous INR was Supratherapeutic    Plan:     Left a detailed message for Liz regarding INR result and instructed:     Warfarin Dosing Instructions:  Continue current warfarin dose 2 mg daily on Mon; and 4 mg daily rest of week  (0 % change)    Instructed patient to follow up no later than: 2-3 weeks    Education provided: importance of therapeutic range    Instructed to call the [facility] for any changes, questions or concerns. (#[phone])   ?   Gayla Ayon RN    Subjective/Objective:      Liz Gonzalez, a 89 y.o. female is on warfarin.     Liz reports:     Home warfarin dose: as updated on anticoagulation calendar per template     Missed doses: No     Medication changes:  No     S/S of bleeding or thromboembolism:  No     New Injury or illness:  No     Changes in diet or alcohol consumption:  No     Upcoming surgery, procedure or cardioversion:  No    Anticoagulation Episode Summary     Current INR goal:   2.0-3.0   TTR:   68.3 % (1 y)   Next INR check:   1/1/2020   INR from last check:   2.60 (12/11/2019)   Weekly max warfarin dose:      Target end date:      INR check location:      Preferred lab:      Send INR reminders to:   [facility]  [facility]    Indications    A-fib (H) (Resolved) [I48.91]           Comments:            Anticoagulation Care Providers     Provider Role Specialty Phone number    Natasha Nunez MD Referring Family Medicine [phone]    Hailey Naqvi FNP Responsible Nurse Practitioner [phone]

## 2021-06-16 NOTE — PROGRESS NOTES
Assessment and Plan:       1. Routine general medical examination at a health care facility  Immunizations reviewed and updated .  Alcohol use, safety and moderation discussed.  Recommended adequate calcium intake/osteoporosis prevention.  Discussed colon cancer screening at age 50, 45 if -American.  Diet and exercise reviewed, including goal of aerobic exercise 30-90 minutes most days of the week, moderation of portions sizes, avoiding eating out and fast food and increase in fruits and vegetables.  Discussed sun protection.  Discussed weight management.      2. Need for pneumococcal vaccination  4 Prevnar today  - Pneumococcal conjugate vaccine 13-valent 6wks-17yrs; >50yrs    3. Essential hypertension  Patient with history of hypertension currently on lisinopril diltiazem carvedilol and furosemide.  Blood pressure is at goal we will continue current medications    4. Restless Legs Syndrome  Patient's restless leg seems to be getting worse.  Will check an iron levels today to see if that is adding to her symptoms.  We discussed other medications to treat restless legs right now she is going to try regular exercise will return if she is interested in trying a medication  - Ferritin  - Iron and Transferrin Iron Binding Capacity    5. Permanent atrial fibrillation  Patient with a history of atrial fibrillation.  She is on warfarin for this.  Because of her kidney disease she is not a candidate for Eliquis as    6. H/O cystitis  Patient with recent bladder infection.  Still has some discomfort with urination will check a UA and urine culture  - Urinalysis-UC if Indicated  - Culture, Urine    7. Stage 4 chronic kidney disease   Patient with stage IV chronic kidney disease.  Her sputum labs are fine her blood pressures under control.  Will continue to monitor symptoms.  - Ferritin    8. Chronic kidney disease, stage IV (severe)     - Iron and Transferrin Iron Binding Capacity    9. A-fib    - INR        The  patient's current medical problems were reviewed.    I have had an Advance Directives discussion with the patient.  The following health maintenance schedule was reviewed with the patient and provided in printed form in the after visit summary:   Health Maintenance   Topic Date Due     DXA SCAN  10/27/2017     FALL RISK ASSESSMENT  02/28/2019     ADVANCE DIRECTIVES DISCUSSED WITH PATIENT  02/28/2023     TD 18+ HE  09/04/2024     PNEUMOCOCCAL POLYSACCHARIDE VACCINE AGE 65 AND OVER  Completed     INFLUENZA VACCINE RULE BASED  Completed     PNEUMOCOCCAL CONJUGATE VACCINE FOR ADULTS (PCV13 OR PREVNAR)  Completed     ZOSTER VACCINE  Completed        Subjective:   Chief Complaint: Liz Gonzalez is an 89 y.o. female here for an Annual Wellness visit. She is here today with her daughter-in-law.   HPI:  Memory loss: She scores a 3 out of 5 on recall on the Minicog. She is not worried about memory. Her family has noticed that she repeats things often, but is nothing out of the ordinary.     UTI: She was seen in the ER for a UTI on 2/17/18. She noticed blood in her urine and called her son. She started on Keflex and later changed to Ciprofloxacin. She is now feeling better and no longer has symptoms.     Insomnia: She does not sleep during the night. She has restless legs and feels unable to calm down. She often walks around at night due to jerking of her legs for many years. She has tried medication and magnesium and nothing works. She notes that her eyes will not shut and her mind does not calm. She does not drink caffeine in the afternoon. She drinks two glasses of wine daily at 4pm. She is taking gabapentin.     Review of Systems:  She is living in a facility and walks up and down the halls three times daily. She does not have trouble with activities of daily living, but is unable to maneuver the vacuum . Her granddaughter helps her clean every couple of weeks. She has adapted to her living facility and her mood  has improved with that. She is now concerned about falling and is using a walker to walk. She has a dexa scan scheduled for May 2018, as well as a Prolia injection. Please see above. The rest of the review of systems are negative for all systems.    Patient Care Team:  Natasha Nunez MD as PCP - General     Patient Active Problem List   Diagnosis     Hyponatremia     Squamous Cell Carcinoma Of The Lung     Cataract     Osteoporosis     Sciatica     Congestive Heart Failure     Restless Legs Syndrome     Essential Hypertension     Atrial Fibrillation     Glaucoma     Osteoarthritis     Coronary Artery Disease     Joint Pain, Localized In The Hip     Shortness Of Breath     Lumbar stenosis with neurogenic claudication     A-fib     Edema     Stage 4 chronic kidney disease      Past Medical History:   Diagnosis Date     Atrial fibrillation      Cancer 2010    squamous cell CA in lower right lobe     CHF (congestive heart failure)      Coronary artery disease      Glaucoma      Hypertension      Hyponatremia      Lung cancer      Osteoarthritis      Osteoporosis      Prinzmetal's angina      Restless leg syndrome      Spinal stenosis       Past Surgical History:   Procedure Laterality Date     BACK SURGERY       breast biopsies      X2     BREAST BIOPSY Left      DILATION AND CURETTAGE OF UTERUS       KNEE SURGERY Left 1992    removed bone chips     LUMBAR LAMINECTOMY Bilateral 6/20/2014    Procedure: POSTERIOR LUMBAR DECOMPRESSION LAMINECTOMY L4-5 BILATERAL;  Surgeon: Florentin Blackmon MD;  [place] [facility] [place]  Service:      LUNG CANCER SURGERY Right 2010    removed lower lobe     LUNG SURGERY  2010    squamous cell CA, right lower lobectomy     WA BIOPSY OF BREAST, INCISIONAL      Description: Biopsy Breast Open;  Recorded: 05/08/2008;     WA BIOPSY OF BREAST, INCISIONAL      Description: Biopsy Breast Open;  Recorded: 05/08/2008;     WA DILATION/CURETTAGE,DIAGNOSTIC      Description: Dilation And  Curettage;  Recorded: 05/08/2008;     FL REMOVAL OF LUNG,LOBECTOMY      Description: Lung Lobectomy;  Recorded: 05/06/2010;  Comments: Lower lobectomy for Ca.      Family History   Problem Relation Age of Onset     No Medical Problems Mother      No Medical Problems Father      No Medical Problems Sister      No Medical Problems Daughter      No Medical Problems Maternal Grandmother      No Medical Problems Maternal Grandfather      No Medical Problems Paternal Grandmother      No Medical Problems Paternal Grandfather      No Medical Problems Maternal Aunt      No Medical Problems Paternal Aunt      BRCA 1/2 Neg Hx      Breast cancer Neg Hx      Cancer Neg Hx      Colon cancer Neg Hx      Endometrial cancer Neg Hx      Ovarian cancer Neg Hx       Social History     Social History     Marital status:      Spouse name: N/A     Number of children: N/A     Years of education: N/A     Occupational History     Not on file.     Social History Main Topics     Smoking status: Former Smoker     Packs/day: 0.25     Years: 50.00     Quit date: 6/19/2010     Smokeless tobacco: Never Used     Alcohol use 4.2 oz/week     7 Glasses of wine per week      Comment: 1 glass wine daily     Drug use: No     Sexual activity: Not on file     Other Topics Concern     Not on file     Social History Narrative      Current Outpatient Prescriptions   Medication Sig Dispense Refill     acetaminophen (TYLENOL) 325 MG tablet Take 650 mg by mouth as needed.        ANTIOX#10/OM3/DHA/EPA/LUT/ZEAX (I-CAPS ORAL) Take 1 tablet by mouth 2 (two) times a day.       CALCIUM CARBONATE/VITAMIN D3 (CALTRATE 600 + D ORAL) Take 1 tablet by mouth daily.       carvedilol (COREG) 25 MG tablet TAKE ONE TABLET BY MOUTH TWICE A DAY WITH MEALS 180 tablet 2     cholecalciferol, vitamin D3, (CHOLECALCIFEROL) 1,000 unit tablet Take 1,000 Units by mouth bedtime.        denosumab (PROLIA) 60 mg/mL Syrg Inject 60 mg under the skin every 6 (six) months. Outpatient  "Injection only.  Due around July 2014       diltiazem (CARDIZEM CD) 120 MG 24 hr capsule Take 2 capsules (240 mg total) by mouth daily. 180 capsule 1     FOLIC ACID/MV,FE,OTHER MIN (CENTRUM ORAL) Take 1 tablet by mouth daily.       furosemide (LASIX) 40 MG tablet TAKE ONE TABLET BY MOUTH TWICE A DAY AT 9:00 AM. AND 6:00 PM. 180 tablet 1     gabapentin (NEURONTIN) 100 MG capsule Increase dose as directed by chart.  May increase to 1 tabs 3 times daily 180 capsule 2     glucosamine-chondroitin 500-400 mg tablet Take 1 tablet by mouth 2 (two) times a day.       latanoprost (XALATAN) 0.005 % ophthalmic solution Administer 1 drop to both eyes bedtime.       lisinopril (PRINIVIL,ZESTRIL) 20 MG tablet TAKE ONE TABLET BY MOUTH TWICE A  tablet 2     nitroglycerin (NITROSTAT) 0.4 MG SL tablet Place 1 tablet (0.4 mg total) under the tongue every 5 (five) minutes as needed for chest pain (pt has never used). 25 tablet 11     timolol (BETIMOL) 0.5 % ophthalmic solution Administer 1 drop into the left eye daily.       warfarin (COUMADIN) 4 MG tablet TAKE ONE-HALF TO ONE TABLET BY MOUTH ONCE A DAY AS DIRECTED, ADJUST DOSE PER INR RESULTS AS INSTRUCTED. 90 tablet 1     No current facility-administered medications for this visit.       Objective:   Vital Signs:   Visit Vitals     /70 (Patient Site: Right Arm, Patient Position: Sitting, Cuff Size: Adult Regular)     Pulse (!) 108  Comment: Irregular     Temp 97.8  F (36.6  C) (Oral)     Resp 20     Ht 4' 9\" (1.448 m)     Wt 104 lb (47.2 kg)     BMI 22.51 kg/m2        VisionScreening:  No exam data present     PHYSICAL EXAM  Constitutional:  Reveals an alert, cooperative, pleasant female in no acute distress.  Vitals:  Per nursing notes.  Ears:  Clear.  Oropharynx:  Without posterior nasal drainage or thrush. Plate on top and partial denture on bottom.   Neck:  Supple, no lymphadenopathy,  thyroid not palpable.  Cardiac:  Irregular rate and rhythm without murmurs, rubs, " or gallops. Carotids without bruits. Legs without edema.   Lungs: Clear.  Respiratory effort normal.  Abdomen:  non-tender, non-distended.  Neither liver nor spleen palpable.  Skin:   Without rash, bruise, or palpable lesions.  Extremities:   Rheumatologic: Normal joints and nails of the hands.  Neurologic:  No gross focal deficits.    Psychiatric:  Mood appropriate, memory intact.       Assessment Results 2/28/2018   Activities of Daily Living No help needed   Instrumental Activities of Daily Living 2-4 - Moderate impairment   Mini Cog Total Score 3   Some recent data might be hidden     A Mini-Cog score of 0-2 suggests the possibility of dementia, score of 3-5 suggests no dementia    Identified Health Risks:     The patient was provided with suggestions to help her develop a healthy lifestyle.   The patient reports that she drinks more than one alcoholic drink per day but denies binge or excessive drinking. She was counseled and given information about possible harmful effects of excessive alcohol intake.  The patient reports that she has difficulty with instrumental activities of daily living.  She was provided with a list of local organizations that provide support services and advised to make a follow up appointment in 12 weeks to address this further.   The patient was provided with written information regarding signs of hearing loss.  The patient's JIN-7 score is consistent with probably anxiety disorder. She is at risk for falling and has been provided with information to reduce the risk of falling at home.  Patient's advanced directive was discussed and I am comfortable with the patient's wishes.      ADDITIONAL HISTORY SUMMARIZED (2): Reviewed ER note from 2/17/18 regarding UTI.   DECISION TO OBTAIN EXTRA INFORMATION (1): None.   RADIOLOGY TESTS (1): None.  LABS (1): Reviewed and ordered labs.   MEDICINE TESTS (1): None.  INDEPENDENT REVIEW (2 each): None.     The visit lasted a total of 26 minutes face to  face with the patient. Over 50% of the time was spent counseling and educating the patient about coordination of care.    I, Teagan Yousif, am scribing for and in the presence of, Dr. Nunez.    I, Dr. Natasha Nunez MD, personally performed the services described in this documentation, as scribed by Teagan Yousif in my presence, and it is both accurate and complete.    Total data points: 3

## 2021-06-16 NOTE — PROGRESS NOTES
ASSESSMENT: Onychauxis, foot pain.    PLAN: Toenails were debrided manually x10. Return to clinic in nine weeks.         SUBJECTIVE: Toenails are long, thick and painful. Last nail debridement in the clinic was January 16, 2018.     OBJECTIVE:  General: Pleasant 89 y.o. female in no acute distress.  Vascular: DP pulses are diminished. PT pulses are absent. Pedal hair is absent. Feet are cool to the touch.  Neuro: Sensation in the feet is grossly intact to light touch.  Derm:  Toenails are elongated, thickened and dystrophic with discoloration and subungual debris. Skin is thin and shiny but intact.   Musculoskeletal: Hammertoes.

## 2021-06-17 NOTE — TELEPHONE ENCOUNTER
Telephone Encounter by Gayla Ayon RN at 4/14/2020 11:24 AM     Author: Gayla Ayon RN Service: -- Author Type: Registered Nurse    Filed: 4/14/2020 11:28 AM Encounter Date: 4/14/2020 Status: Attested    : Gayla Ayon RN (Registered Nurse) Cosigner: July Storm MD at 4/14/2020 12:13 PM    Attestation signed by July Storm MD at 4/14/2020 12:13 PM    .                ANTICOAGULATION  MANAGEMENT    Assessment     Today's INR result of 2.6 is Therapeutic (goal INR of 2.0-3.0)        Warfarin taken as previously instructed    No new diet changes affecting INR    No new medication/supplements affecting INR    Continues to tolerate warfarin with no reported s/s of bleeding or thromboembolism     Previous INR was Therapeutic    Plan:     Spoke with Liz regarding INR result and instructed:     Warfarin Dosing Instructions:  Continue current warfarin dose 2 mg daily on Mon/Wed/Fri; and 4 mg daily rest of week  (0 % change)    Instructed patient to follow up no later than: 6 weeks    Education provided: importance of therapeutic range    Liz verbalizes understanding and agrees to warfarin dosing plan.    Instructed to call the [facility] for any changes, questions or concerns. (#[phone])   ?   Gayla Ayon RN    Subjective/Objective:      Liz Gonzalez, a 89 y.o. female is on warfarin.     Liz reports:     Home warfarin dose: as updated on anticoagulation calendar per template     Missed doses: No     Medication changes:  No     S/S of bleeding or thromboembolism:  No     New Injury or illness:  No     Changes in diet or alcohol consumption:  No     Upcoming surgery, procedure or cardioversion:  No    Anticoagulation Episode Summary     Current INR goal:   2.0-3.0   TTR:   65.5 % (1 y)   Next INR check:   5/26/2020   INR from last check:   2.60 (4/14/2020)   Weekly max warfarin dose:      Target end date:      INR check location:       Preferred lab:      Send INR reminders to:   [facility]    Indications    A-fib (H) (Resolved) [I48.91]           Comments:            Anticoagulation Care Providers     Provider Role Specialty Phone number    Natasha Nunez MD Referring Family Medicine [phone]    Hailey Naqvi FNP Responsible Nurse Practitioner [phone]

## 2021-06-17 NOTE — PATIENT INSTRUCTIONS - HE
Patient Instructions by Ron Wade, PT at 7/8/2019 10:00 AM     Author: Ron Wade PT Service: -- Author Type: Physical Therapist    Filed: 7/8/2019 10:17 AM Encounter Date: 7/8/2019 Status: Signed    : Ron Wade, PT (Physical Therapist)        STANDING NBOS WITH EYES OPEN    Stand with your feet together.  Look straight ahead.    Hold 15 seconds.  3-5 repetitions.  1-2 times per day.

## 2021-06-17 NOTE — PATIENT INSTRUCTIONS - HE
Patient Instructions by Ron Wade, PT at 6/3/2019 10:30 AM     Author: Ron Wade PT Service: -- Author Type: Physical Therapist    Filed: 6/3/2019 11:06 AM Encounter Date: 6/3/2019 Status: Signed    : Ron Wade, PT (Physical Therapist)        SINGLE KNEE TO CHEST STRETCH - SKTC    While Lying on your back,  hold your knee and gently pull it up towards your chest.    Hold 15-30 seconds  2X/side  2X/day    Alternate:        BALL SQUEEZE - SEATED    While sitting, place a ball between your knees. Squeeze the ball with your knees and hold.    Hold 5 seconds  10X  1X/day    HIP ABDUCTION ISOMETRIC WITH BAND - SEATED    While sitting, place band around your knees.  Gently press knees outward against the band and hold.    10X  1X/day

## 2021-06-17 NOTE — PATIENT INSTRUCTIONS - HE
Patient Instructions by Ron Wade PT at 6/10/2019 10:00 AM     Author: Ron Wade PT Service: -- Author Type: Physical Therapist    Filed: 6/10/2019 10:27 AM Encounter Date: 6/10/2019 Status: Signed    : Ron Wade PT (Physical Therapist)          HIP FLEXION - STANDING  -SLR    While standing, raise your leg forward as shown.     Use your arms for support if needed for balance and safety.     X 15-20  1X/day        HIP ABDUCTION - STANDING     While standing, raise your leg out to the side. Keep your knee straight and maintain your toes pointed forward the entire time.      Use your arms for support if needed for balance and safety.     X 15-20  1X/day      HIP EXTENSION - STANDING    While standing, move your leg back as shown.    Use your arms for support if needed for balance and safety.     X 15-20  1X/day         STANDING HEEL RAISES    While standing, raise up on your toes as you lift your heels off the ground.    You can also pull your toes up and rock back on your heels.    20X  1X/day        PARTIAL HEEL-TOE STANCE    Stand with your right foot partially in front of the other.  Look straight ahead.    Repeat with left foot in front.    Hold this position for 30 seconds.  3X/side  1-2X/day        TANDEM STANCE    Stand with one foot directly in front of the other so that the toes of one foot touches the heel of the other. Maintain your balance.  If this is too difficult, take a step forward and maintain your balance in that position.    Hold this position for 30 seconds.  3X/side  1-2X/day

## 2021-06-18 ENCOUNTER — RECORDS - HEALTHEAST (OUTPATIENT)
Dept: ADMINISTRATIVE | Facility: CLINIC | Age: 86
End: 2021-06-18

## 2021-06-18 NOTE — PROGRESS NOTES
"  1. Osteoporosis         Return in about 6 months (around 12/7/2018) for Recheck.    Patient Instructions   Prolia 16th today.  Prolia 17th in 6 months with my nurse. I will see you in 1 year.    DXA in 2 years .   Phone number to schedule [phone].    Daily calcium need is 3584-9050 mg a day from the diet and supplements.  Calcium citrate is easier to digest.  Vitamin D 2000 IU daily recommended.      Chief Complaint   Patient presents with     Follow-up       /60  Pulse (!) 116  Temp 97.8  F (36.6  C) (Oral)   Resp 16  Ht 4' 9\" (1.448 m)  Wt 105 lb 2 oz (47.7 kg)  BMI 22.75 kg/m2      Did you experience any problems with previous Prolia injection? no  Any medication change in the last 6 months? no  Did you take prednisone or other immunosupressant drugs in the last 6 months   (chemo, transplant, rheum, dermatology conditions)? no  Did you have any serious infection in the last 6 months?no  Any recent hospitalizations?no  Do you plan any dental work in the next 2-3 months?no  How much calcium do you take daily from the diet and supplements?1200 mg  How much vit D do you take daily? 2000 IU  Last DXA? Done today, discussed and reviewed      Patient is here today for the 16th Prolia injection. Patient tolerated previous injections well.   We discussed calcium and vit D daily needs today.   Next Prolia injection will be in 6 months.     15 minutes spent with the patient and more then 50 % of the time in counseling.  This note has been dictated using voice recognition software. Any grammatical or context distortions are unintentional and inherent to the software      Patient Active Problem List   Diagnosis     Squamous Cell Carcinoma Of The Lung     Cataract     Osteoporosis     Sciatica     Congestive Heart Failure     Restless Legs Syndrome     Essential Hypertension     Atrial Fibrillation     Glaucoma     Osteoarthritis     Coronary Artery Disease     Lumbar stenosis with neurogenic claudication "     Stage 4 chronic kidney disease        Current Outpatient Prescriptions on File Prior to Visit   Medication Sig Dispense Refill     acetaminophen (TYLENOL) 325 MG tablet Take 650 mg by mouth as needed.        ANTIOX#10/OM3/DHA/EPA/LUT/ZEAX (I-CAPS ORAL) Take 1 tablet by mouth 2 (two) times a day.       CALCIUM CARBONATE/VITAMIN D3 (CALTRATE 600 + D ORAL) Take 1 tablet by mouth daily.       carvedilol (COREG) 25 MG tablet TAKE ONE TABLET BY MOUTH TWICE A DAY WITH MEALS 180 tablet 2     cholecalciferol, vitamin D3, (CHOLECALCIFEROL) 1,000 unit tablet Take 1,000 Units by mouth bedtime.        denosumab (PROLIA) 60 mg/mL Syrg Inject 60 mg under the skin every 6 (six) months. Outpatient Injection only.  Due around July 2014       diltiazem (CARDIZEM CD) 120 MG 24 hr capsule Take 2 capsules (240 mg total) by mouth daily. 180 capsule 2     FOLIC ACID/MV,FE,OTHER MIN (CENTRUM ORAL) Take 1 tablet by mouth daily.       furosemide (LASIX) 40 MG tablet TAKE ONE TABLET BY MOUTH TWICE A DAY AT 9:00 AM. AND 6:00 PM. 180 tablet 1     glucosamine-chondroitin 500-400 mg tablet Take 1 tablet by mouth 2 (two) times a day.       latanoprost (XALATAN) 0.005 % ophthalmic solution Administer 1 drop to both eyes bedtime.       lisinopril (PRINIVIL,ZESTRIL) 20 MG tablet TAKE ONE TABLET BY MOUTH TWICE A  tablet 2     nitroglycerin (NITROSTAT) 0.4 MG SL tablet Place 1 tablet (0.4 mg total) under the tongue every 5 (five) minutes as needed for chest pain (pt has never used). 25 tablet 11     timolol (BETIMOL) 0.5 % ophthalmic solution Administer 1 drop into the left eye daily.       warfarin (COUMADIN) 4 MG tablet TAKE ONE-HALF TO ONE TABLET BY MOUTH ONCE A DAY AS DIRECTED, ADJUST DOSE PER INR RESULTS AS INSTRUCTED. 90 tablet 1     [DISCONTINUED] gabapentin (NEURONTIN) 100 MG capsule Increase dose as directed by chart.  May increase to 1 tabs 3 times daily 180 capsule 2     No current facility-administered medications on file prior to  visit.

## 2021-06-18 NOTE — PROGRESS NOTES
"Cardiology Progress Note    Assessment:  Chronic atrial fibrillation, satisfactory rate control, on warfarin  Chronic diastolic heart failure, stable weight, no fluid overload  Right heart failure and mild pulmonary hypertension secondary to chronic diastolic heart failure  History of stress cardiomyopathy, normalization of LV systolic performance  Coronary artery disease, mild, nonobstructive  COPD  Hypertension, good control      Plan:  Continue current medications.  Lenient rate control is probably adequate for this lady.  I encouraged her to continue to go for daily walks.    Follow-up in 6 months    Subjective:   This is 89 y.o. female who comes in today for follow-up visit.  She reports no new cardiac symptoms.  She goes for daily walks.  She covers about a mile each day.  She denies weight gain, PND, orthopnea.  She does not complain of heart palpitations or syncope.    Review of Systems:   General: WNL  Eyes: WNL  Ears/Nose/Throat: WNL  Lungs: WNL  Heart: WNL  Stomach: WNL  Bladder: WNL  Muscle/Joints: WNL  Skin: WNL  Nervous System: WNL  Mental Health: WNL     Blood: WNL    Objective:   /68 (Patient Site: Left Arm, Patient Position: Sitting, Cuff Size: Adult Regular)  Pulse 95  Resp 16  Ht 4' 9\" (1.448 m)  Wt 103 lb (46.7 kg)  BMI 22.29 kg/m2  Physical Exam:  GENERAL: no distress  NECK: No JVD  LUNGS: Clear to auscultation.  CARDIAC: irregular rhythm, S1 & S2 normal.  No heaves, thrills, gallops or murmurs.  ABDOMEN: flat, negative hepatosplenomegaly, soft and non-tender.  EXTREMITIES: No evidence of cyanosis, clubbing or edema.    Current Outpatient Prescriptions   Medication Sig Dispense Refill     acetaminophen (TYLENOL) 325 MG tablet Take 650 mg by mouth as needed.        ANTIOX#10/OM3/DHA/EPA/LUT/ZEAX (I-CAPS ORAL) Take 1 tablet by mouth 2 (two) times a day.       CALCIUM CARBONATE/VITAMIN D3 (CALTRATE 600 + D ORAL) Take 1 tablet by mouth daily.       carvedilol (COREG) 25 MG tablet TAKE ONE " TABLET BY MOUTH TWICE A DAY WITH MEALS 180 tablet 2     cholecalciferol, vitamin D3, (CHOLECALCIFEROL) 1,000 unit tablet Take 1,000 Units by mouth bedtime.        denosumab (PROLIA) 60 mg/mL Syrg Inject 60 mg under the skin every 6 (six) months. Outpatient Injection only.  Due around July 2014       diltiazem (CARDIZEM CD) 120 MG 24 hr capsule Take 2 capsules (240 mg total) by mouth daily. 180 capsule 2     FOLIC ACID/MV,FE,OTHER MIN (CENTRUM ORAL) Take 1 tablet by mouth daily.       furosemide (LASIX) 40 MG tablet TAKE ONE TABLET BY MOUTH TWICE A DAY AT 9:00 AM. AND 6:00 PM. 180 tablet 1     glucosamine-chondroitin 500-400 mg tablet Take 1 tablet by mouth 2 (two) times a day.       latanoprost (XALATAN) 0.005 % ophthalmic solution Administer 1 drop to both eyes bedtime.       lisinopril (PRINIVIL,ZESTRIL) 20 MG tablet TAKE ONE TABLET BY MOUTH TWICE A  tablet 2     nitroglycerin (NITROSTAT) 0.4 MG SL tablet Place 1 tablet (0.4 mg total) under the tongue every 5 (five) minutes as needed for chest pain (pt has never used). 25 tablet 11     timolol (BETIMOL) 0.5 % ophthalmic solution Administer 1 drop into the left eye daily.       warfarin (COUMADIN) 4 MG tablet TAKE ONE-HALF TO ONE TABLET BY MOUTH ONCE A DAY AS DIRECTED, ADJUST DOSE PER INR RESULTS AS INSTRUCTED. 90 tablet 1     Current Facility-Administered Medications   Medication Dose Route Frequency Provider Last Rate Last Dose     denosumab 60 mg (PROLIA 60 mg/ml)  60 mg Subcutaneous Q6 Months Hank Austin MD   60 mg at 06/07/18 1310       Cardiographics:    Echocardiogram: April 2016   Normal left ventricular size.  Normal left ventricular wall thickness.  Left ventricular ejection fraction is visually estimated to be 55%.  No regional wall motion abnormalities.  Moderately enlarged right ventricle.  Right ventricular systolic function is mildly reduced.  Estimated right ventricular systolic pressure is 50 mmHg.  Severely enlarged left  atrium.  Dilated IVC with reduced inspiratory collapse is consistent with elevated  right atrial pressure.  This study was compared with a previously done echocardiogram 11/13/14.  The results are similar.       Lab Results:       Lab Results   Component Value Date    CHOL 133 05/11/2010     Lab Results   Component Value Date    HDL 44 05/11/2010     Lab Results   Component Value Date    LDLCALC 68 05/11/2010     Lab Results   Component Value Date    TRIG 104 05/11/2010     No components found for: CHOLHDL  BNP   Date Value Ref Range Status   03/10/2017 749 (H) 0 - 167 pg/mL Final       Galdino (Mk)  MD Roxana

## 2021-06-19 NOTE — LETTER
Letter by Galdino Schmidt MD (Ted) at      Author: Galdino Schmidt MD (Ted) Service: -- Author Type: --    Filed:  Encounter Date: 5/9/2019 Status: (Other)         Liz Gonzalez  [address] [place] [address] [address]  [facility] [id number]      May 9, 2019      Dear Liz,    This letter is to remind you that you will be due for your follow up appointment with Dr. Mk Schmidt . To help ensure you are in the best health possible, a regular follow-up with your cardiologist is essential.     Please call our Patient Scheduling Line at [phone] to schedule your appointment at your earliest convenience.  If you have recently scheduled an appointment, please disregard this letter.    We look forward to seeing you again. As always, we are available at the number  above for any questions or concerns you may have.      Sincerely,     The Physicians and Staff of [facility]

## 2021-06-19 NOTE — LETTER
Letter by Jean Redd MD at      Author: Jean Redd MD Service: -- Author Type: --    Filed:  Encounter Date: 11/20/2019 Status: Signed         Liz Gonzalez  [address]  [facility] [address]             November 20, 2019         Dear Ms. Gonzalez,    Below are the results from your recent visit:    Resulted Orders   Basic Metabolic Panel   Result Value Ref Range    Sodium 141 136 - 145 mmol/L    Potassium 4.6 3.5 - 5.0 mmol/L    Chloride 108 (H) 98 - 107 mmol/L    CO2 21 (L) 22 - 31 mmol/L    Anion Gap, Calculation 12 5 - 18 mmol/L    Glucose 110 70 - 125 mg/dL    Calcium 8.9 8.5 - 10.5 mg/dL    BUN 48 (H) 8 - 28 mg/dL    Creatinine 1.82 (H) 0.60 - 1.10 mg/dL    GFR MDRD Af Amer 32 (L) >60 mL/min/1.73m2    GFR MDRD Non Af Amer 26 (L) >60 mL/min/1.73m2    Narrative    Fasting Glucose reference range is 70-99 mg/dL per  American Diabetes Association (ADA) guidelines.     The creatinine result that his kidney function test remains stable compared to previous results.    Please call with questions or contact us using [employer].    Sincerely,        Electronically signed by Jean Redd MD

## 2021-06-19 NOTE — LETTER
Letter by Raya Nieto AuD at      Author: Raya Nieto AuD Service: -- Author Type: --    Filed:  Encounter Date: 9/23/2019 Status: Signed       [facility]- Audiology Benefit Letter    GENE STRINGER  5/28/1928  [address] [place] [address] [address]  [facility] [address]    Insurance Company: Payor: MEDICARE / Plan: MEDICARE A AND B / Product Type: Medicare /      [place] Product: NO    ID # :  MEDICARE   [id number]             [facility]      [id number]    Group#:  N/A    Estimate of Benefits  Consult Visit Benefits:  MEDICARE 05/93 ELIG, AAR 01/19 ELIG    HAE, HAF, HAC Benefits:   NOT A COVERED SERVICE. PLEASE BILL AS A SELF PAY BUNDLE PACKAGE.     Batteries/Accessories Coverage:  NOT COVERED    Representative name: VIA RTE  Call reference:   Date of contact: 9/23/19    Insurance verified today by TAMIKA SERRANO    Additional Information:      The information provided is an estimate of benefits. This does not guarantee coverage; the insurance company will make the final determination of coverage to include patient responsibility of payment by the patient.   [facility] is not responsible for the decisions made by the insurance company regarding coverage.  Any changes to coverage (new plan or new policy) or procedures may void the contents provided in this letter.     Term Definitions  Patient responsibility: Can include but not limited to: co-pays, co-insurance, deductibles, out-of-pocket and non-covered and/or policy exclusions.

## 2021-06-19 NOTE — LETTER
Letter by Hailey Naqvi FNP at      Author: Hailey Naqvi FNP Service: -- Author Type: --    Filed:  Encounter Date: 7/9/2019 Status: (Other)         Liz Gonzalez  [address] [place] [address] [address]  [facility] [address]             July 9, 2019         Dear Ms. Gonzalez,    Below are the results from your recent visit:    Resulted Orders   C. difficile Toxigenic by PCR   Result Value Ref Range    C.Difficile Toxigenic by PCR Negative Negative    Ribotype 027/NAP1/B1 Presumptive Negative Presumptive Negative    Narrative    Intended use:     The [employer] Xpert  C. difficile/Epi Assay is a qualitative in vitro diagnostic test for rapid detection of toxin B gene sequences and for presumptive identification of 027/NAP1/BI strains of toxigenic Clostridium Difficile from unformed stool specimens collected from patients suspected of having C. difficile infection (CDI). The Xpert C. difficile/Epi Assay is intended as an aid in the diagnosis of CDI. Detection of 027/NAP1/BI strains of C. difficile by the Xpert C. difficile/Epi Assay is presumptive and is solely for epidemiological purposes and is not intended to guide or monitor treatment for C. difficile infections. The [employer] Xpert C. difficile/Epi Assay is intended for use in hospital, reference or state laboratory settings.  The device is not intended for point-of-care use.       Methodology:     The [employer] Instrument Systems automate and integrate sample lysis, nucleic acid purification and amplification, and detection of the target sequence in simple or complex samples using real-time polymerase chain reaction (PCR). The [employer] Xpert  C. difficile/Epi assay detects the toxin B gene (tcdB), the binary toxin gene (CDT), and the single-base-pair deletion at nucleotide 117 within the gene encoding a negative regulator of toxin production (tcdC?117). Presumptive identification of 027/NAP1/BI strains of C. difficile is by detection of binary toxin (CDT) gene sequences and the single  base pair deletion at nucleotide 117 in the tcdC gene. The tcdC gene encodes for a negative regulator in C. difficile toxin production. A fluorescent signal becomes detected and increases each time the specific DNA strand is amplified. The Real-time PCR generates a growth curve with number of cycles on the x-axis and fluorescence on the y-axis. If the organism's DNA is not detected by the real-time  PCR reaction the growth curve will be flat and will be resulted as negative.     Ova and Parasite, Stool   Result Value Ref Range    Ova + Parasite Exam No ova or parasites seen No Ova or Parasites seen   EnteroHaemorrhagic E. coli Work Up   Result Value Ref Range    Shiga Toxin 1 Negative Negative    Shiga Toxin 2 Negative Negative       Stool studies returned negative. Is diarrhea still present? If so would consider further testing or GI consult.    Please call with questions or contact us using [employer].    Sincerely,        Electronically signed by SHUN Stevens

## 2021-06-19 NOTE — LETTER
Letter by Hailey Naqvi FNP at      Author: Hailey Naqvi FNP Service: -- Author Type: --    Filed:  Encounter Date: 4/25/2019 Status: (Other)         Liz Gonzalez  [address] [place] [address] [address]  [facility] [address]             April 25, 2019         Dear Ms. Gonzalez,    Below are the results from your recent visit:    Resulted Orders   Comprehensive Metabolic Panel   Result Value Ref Range    Sodium 142 136 - 145 mmol/L    Potassium 5.0 3.5 - 5.0 mmol/L    Chloride 106 98 - 107 mmol/L    CO2 21 (L) 22 - 31 mmol/L    Anion Gap, Calculation 15 5 - 18 mmol/L    Glucose 111 70 - 125 mg/dL    BUN 62 (H) 8 - 28 mg/dL    Creatinine 2.21 (H) 0.60 - 1.10 mg/dL    GFR MDRD Af Amer 25 (L) >60 mL/min/1.73m2    GFR MDRD Non Af Amer 21 (L) >60 mL/min/1.73m2    Bilirubin, Total 0.5 0.0 - 1.0 mg/dL    Calcium 10.2 8.5 - 10.5 mg/dL    Protein, Total 6.9 6.0 - 8.0 g/dL    Albumin 4.4 3.5 - 5.0 g/dL    Alkaline Phosphatase 37 (L) 45 - 120 U/L    AST 22 0 - 40 U/L    ALT 14 0 - 45 U/L    Narrative    Fasting Glucose reference range is 70-99 mg/dL per  American Diabetes Association (ADA) guidelines.   HM2(CBC w/o Differential)   Result Value Ref Range    WBC 6.1 4.0 - 11.0 thou/uL    RBC 3.60 (L) 3.80 - 5.40 mill/uL    Hemoglobin 11.5 (L) 12.0 - 16.0 g/dL    Hematocrit 34.4 (L) 35.0 - 47.0 %    MCV 96 80 - 100 fL    MCH 32.0 27.0 - 34.0 pg    MCHC 33.5 32.0 - 36.0 g/dL    RDW 12.3 11.0 - 14.5 %    Platelets 252 140 - 440 thou/uL    MPV 6.3 (L) 7.0 - 10.0 fL        Kidney function had declined some with your recent lab work. Have there been any new medications in  the last 6 months? I would like to recheck labs within a couple weeks and follow up based on those  results. For recheck patient should make sure she is well hydrated prior to coming in.    Please call with questions or contact us using [employer].    Sincerely,        Electronically signed by SHUN Stevens

## 2021-06-20 NOTE — LETTER
Letter by Hank Austin MD at      Author: Hank Austin MD Service: -- Author Type: --    Filed:  Encounter Date: 12/12/2019 Status: Signed         Liz Gonzalez  [address]  [facility] [address]             December 12, 2019         Dear Ms. Gonzalez,    Below are the results from your recent visit:    Resulted Orders   XR Femur Left 2 VWS    Narrative    EXAM: XR FEMUR LEFT 2 VWS  LOCATION: [facility]  DATE/TIME: 12/11/2019 2:45 PM    INDICATION: Pain in left hip  COMPARISON: None.      Impression    Bones are demineralized. No acute fracture or dislocation. Mild degenerative osteophyte formation of the left femoral head. Small chronic appearing calcification in the soft tissues along the medial aspect of the medial femoral condyle.   Vascular calcifications in the leg.             Please call with questions or contact us using [email or web address].    Sincerely,        Electronically signed by Hank Austin MD

## 2021-06-22 NOTE — PROGRESS NOTES
"Cardiology Progress Note    Assessment:  Permanent atrial fibrillation, satisfactory rate control, on warfarin  Chronic diastolic heart failure, stable   Right heart failure and mild pulmonary hypertension secondary to chronic diastolic heart failure  History of stress cardiomyopathy, normalization of LV systolic performance  Coronary artery disease, mild, nonobstructive  COPD  Hypertension, good control      Plan:  She has been very stable from cardiac standpoint.  We will not change any medications today.  I encouraged her to stay physically active.  Follow-up in 6 months    Subjective:   This is 89 y.o. female who comes in today for follow-up visit.  She reports no new cardiac symptoms.  She continues to walk about a mile every day.  She denies weight gain, PND, orthopnea.  She has not had heart palpitations or syncope.    Review of Systems:   General: WNL  Eyes: WNL  Ears/Nose/Throat: WNL  Lungs: WNL  Heart: WNL  Stomach: WNL  Bladder: WNL  Muscle/Joints: Joint Pain  Skin: WNL  Nervous System: WNL  Mental Health: WNL     Blood: WNL    Objective:   /68 (Patient Site: Right Arm, Patient Position: Sitting, Cuff Size: Adult Small)   Pulse 80   Resp 16   Ht 4' 9\" (1.448 m)   Wt 104 lb (47.2 kg)   BMI 22.51 kg/m    Physical Exam:  GENERAL: no distress  NECK: No JVD  LUNGS: Few crackles at the bases  CARDIAC: irregular rhythm, S1 & S2 normal.  No heaves, thrills, gallops or murmurs.  ABDOMEN: flat, negative hepatosplenomegaly, soft and non-tender.  EXTREMITIES: No evidence of cyanosis, clubbing or edema.    Current Outpatient Medications   Medication Sig Dispense Refill     acetaminophen (TYLENOL) 325 MG tablet Take 650 mg by mouth as needed.        ANTIOX#10/OM3/DHA/EPA/LUT/ZEAX (I-CAPS ORAL) Take 1 tablet by mouth 2 (two) times a day.       CALCIUM CARBONATE/VITAMIN D3 (CALTRATE 600 + D ORAL) Take 1 tablet by mouth daily.       carvedilol (COREG) 25 MG tablet TAKE ONE TABLET BY MOUTH TWICE A DAY WITH MEALS " 180 tablet 2     cholecalciferol, vitamin D3, (CHOLECALCIFEROL) 1,000 unit tablet Take 1,000 Units by mouth bedtime.        denosumab (PROLIA) 60 mg/mL Syrg Inject 60 mg under the skin every 6 (six) months. Outpatient Injection only.  Due around July 2014       diltiazem (CARDIZEM CD) 120 MG 24 hr capsule Take 2 capsules (240 mg total) by mouth daily. 180 capsule 2     FOLIC ACID/MV,FE,OTHER MIN (CENTRUM ORAL) Take 1 tablet by mouth daily.       furosemide (LASIX) 40 MG tablet TAKE ONE TABLET BY MOUTH TWICE A DAY AT 9 A.M. AND 6 P.M. 180 tablet 1     glucosamine-chondroitin 500-400 mg tablet Take 1 tablet by mouth 2 (two) times a day.       latanoprost (XALATAN) 0.005 % ophthalmic solution Administer 1 drop to both eyes bedtime.       lisinopril (PRINIVIL,ZESTRIL) 20 MG tablet TAKE ONE TABLET BY MOUTH TWICE A  tablet 1     nitroglycerin (NITROSTAT) 0.4 MG SL tablet Place 1 tablet (0.4 mg total) under the tongue every 5 (five) minutes as needed for chest pain (pt has never used). 25 tablet 11     timolol (BETIMOL) 0.5 % ophthalmic solution Administer 1 drop into the left eye daily.       warfarin (COUMADIN) 4 MG tablet Take 0.5-1 tablets (2-4 mg total) by mouth daily. Adjust dose per INR results as instructed. 90 tablet 1     Current Facility-Administered Medications   Medication Dose Route Frequency Provider Last Rate Last Dose     denosumab 60 mg (PROLIA 60 mg/ml)  60 mg Subcutaneous Q6 Months Hank Austin MD   60 mg at 12/10/18 1017       Cardiographics:      Echocardiogram: April 2016   Normal left ventricular size.  Normal left ventricular wall thickness.  Left ventricular ejection fraction is visually estimated to be 55%.  No regional wall motion abnormalities.  Moderately enlarged right ventricle.  Right ventricular systolic function is mildly reduced.  Estimated right ventricular systolic pressure is 50 mmHg.  Severely enlarged left atrium.  Dilated IVC with reduced inspiratory collapse is  consistent with elevated  right atrial pressure.  This study was compared with a previously done echocardiogram 11/13/14.  The results are similar.        Lab Results:       Lab Results   Component Value Date    CHOL 133 05/11/2010     Lab Results   Component Value Date    HDL 44 05/11/2010     Lab Results   Component Value Date    LDLCALC 68 05/11/2010     Lab Results   Component Value Date    TRIG 104 05/11/2010     BNP   Date Value Ref Range Status   03/10/2017 749 (H) 0 - 167 pg/mL Final       Galdino (Mk)  MD Roxana

## 2021-06-22 NOTE — PATIENT INSTRUCTIONS - HE
Liz Gonzalez,    It was a pleasure to see you today at the [facility].     My recommendations after this visit include:    Same medications    WChris Schmidt MD, FACC, SUAD

## 2021-06-22 NOTE — PROGRESS NOTES
"Prolia Injection Phone Screen      Screening questions have been asked 2-3 days prior to administration visit for Prolia. If any questions are answered with \"Yes,\" this phone encounter were will routed to ordering provider for further evaluation.     1.  When was the last injection?  6/7/2018    2.  Has insurance for this injection been verified?  Yes    3.  Did you experience any new onset achiness or rashes that lasted for over a month with your previous Prolia injection?   No    4.  Do you have a fever over 101?F or a new deep cough that is unusual for you today? No    5.  Have you started any new medications in the last 6 months that you were told could affect your immune system? These may have been prescribed by oncologist, transplant, rheumatology, or dermatology.   No    6.  In the last 6 months have you have gastric bypass or parathyroid surgery?   No    7.  Do you plan dental work requiring drilling into the bone such as implants/extractions or oral surgery in the next 2-3 months?   No    Patient informed if symptoms discussed above present prior to their administration appointment, they are to notify clinic immediately.     Kira Layton     1. Did you experience any problems with previous Prolia injection? none  2. Do you feel sick today?(fever, RS, GI,  issues)? no  3. Any medication changes in the last 6 months? no  4. Did you take prednisone or other immunosuppressant drugs in the last 6 months?(chemo, transplant, rheu, dermatology conditions)? no  5. Did you have any serious infection in the last 6 months? (pancreatitis) no  6. Any recent hospitalizations/ surgeries (especially gastric bypass, thyroid, parathyroid)? no  7. Do you plan any dental work?(especially implants and extractions) in the next 2-3 months? no  8. Did you have any fractures in the last year? no                                    "

## 2021-06-22 NOTE — TELEPHONE ENCOUNTER
ANTICOAGULATION  MANAGEMENT    Assessment     Today's INR result of 2.4 is Therapeutic (goal INR of 2.0-3.0)        Warfarin taken as previously instructed    No new diet changes affecting INR    No new medication/supplements affecting INR    Continues to tolerate warfarin with no reported s/s of bleeding or thromboembolism     Previous INR was Therapeutic    Plan:     Spoke with Mary - daughter in law ( POA ) regarding INR result and instructed:     Warfarin Dosing Instructions:  Continue current warfarin dose    2 mg on Mon; 4 mg all other days      (0 % change)    Instructed patient to follow up no later than: 4-6 weeks. Prefers to call to make the appointment.    Education provided: importance of therapeutic range and target INR goal and significance of current INR result    Mary verbalizes understanding and agrees to warfarin dosing plan.    Instructed to call the [facility] for any changes, questions or concerns. (#[phone])   ?   Deena Shah RN    Subjective/Objective:      Liz Gonzalez, a 89 y.o. female is on warfarin.     Liz reports:     Home warfarin dose: as updated on anticoagulation calendar per template     Missed doses: No     Medication changes:  No     S/S of bleeding or thromboembolism:  No     New Injury or illness:  No     Changes in diet or alcohol consumption:  No     Upcoming surgery, procedure or cardioversion:  No    Anticoagulation Episode Summary     Current INR goal:   2.0-3.0   TTR:   75.4 % (4.1 y)   Next INR check:   2/14/2019   INR from last check:   2.40 (1/3/2019)   Weekly max warfarin dose:      Target end date:      INR check location:      Preferred lab:      Send INR reminders to:   ANTICOAGULATION POOL C (DTN,VAD,CGR,GAV)    Indications    A-fib (H) (Resolved) [I48.91]           Comments:            Anticoagulation Care Providers     Provider Role Specialty Phone number    Natasha Nunez MD Referring Family Medicine [phone]

## 2021-06-23 NOTE — TELEPHONE ENCOUNTER
Who is calling:  Patient   Reason for Call:  Is home sooner than expected   Date of last appointment with primary care:   Has the patient been recently seen:    Okay to leave a detailed message: Yes

## 2021-06-23 NOTE — TELEPHONE ENCOUNTER
ANTICOAGULATION  MANAGEMENT    Assessment     Today's INR result of 3.3 is Supratherapeutic (goal INR of 2.0-3.0)        Warfarin taken as previously instructed    Decreased greens/vitamin K intake may be affecting INR     Will plan to resume current doses due to patient is scheduled to take her lower dose and she is planning to have greens ( spinach/broccoli salad today) and planning to go back to her vit.k greens intake.    No new medication/supplements affecting INR    Continues to tolerate warfarin with no reported s/s of bleeding or thromboembolism     Previous INR was Therapeutic     Patient stated that she would like to be called to discuss INR's/dosing moving forward.    Plan:     Spoke with Liz regarding INR result and instructed:     Warfarin Dosing Instructions:  Continue current warfarin dose    2 mg on Mon; 4 mg all other days      (0 % change)    Instructed patient to follow up no later than: 2 weeks. Appointment    Education provided: importance of consistent vitamin K intake, impact of vitamin K foods on INR, vitamin K content of foods, importance of therapeutic range and target INR goal and significance of current INR result    Liz verbalizes understanding and agrees to warfarin dosing plan.    Instructed to call the [facility] for any changes, questions or concerns. (#[phone])   ?   Deena Shah RN    Subjective/Objective:      Liz Gonzalez, a 89 y.o. female is on warfarin.     Liz reports:     Home warfarin dose: as updated on anticoagulation calendar per template     Missed doses: No     Medication changes:  No     S/S of bleeding or thromboembolism:  No     New Injury or illness:  No     Changes in diet or alcohol consumption:  Per patient she did not eat greens/vit k per routine.     Upcoming surgery, procedure or cardioversion:  No    Anticoagulation Episode Summary     Current INR goal:   2.0-3.0   TTR:   75.2 % (4.2 y)   Next INR check:   2/25/2019   INR from last  check:   3.30! (2/11/2019)   Weekly max warfarin dose:      Target end date:      INR check location:      Preferred lab:      Send INR reminders to:   ANTICOAGULATION POOL C (DTN,VAD,CGR,GAV)    Indications    A-fib (H) (Resolved) [I48.91]           Comments:            Anticoagulation Care Providers     Provider Role Specialty Phone number    Natasha Nunez MD Referring Family Medicine [phone]

## 2021-06-24 NOTE — TELEPHONE ENCOUNTER
ANTICOAGULATION  MANAGEMENT    Assessment     Today's INR result of 2.4 is Therapeutic (goal INR of 2.0-3.0)        Warfarin taken as previously instructed    Eating greens/vitamin k consistently now may be affecting diet and INR    No new medication/supplements affecting INR    Continues to tolerate warfarin with no reported s/s of bleeding or thromboembolism     Previous INR was Supratherapeutic due to decreased greens/vit k intake.    Plan:     Spoke with Liz regarding INR result and instructed:     Warfarin Dosing Instructions:  Continue current warfarin dose    2 mg every Mon; 4 mg all other days      (0 % change)    Instructed patient to follow up no later than: 2 -4 weeks.Appointment made.    Education provided: importance of consistent vitamin K intake, importance of therapeutic range and target INR goal and significance of current INR result    Liz verbalizes understanding and agrees to warfarin dosing plan.    Instructed to call the [facility] for any changes, questions or concerns. (#[phone])   ?   Deena Shah RN    Subjective/Objective:      Liz Gonzalez, a 89 y.o. female is on warfarin.     Liz reports:     Home warfarin dose: as updated on anticoagulation calendar per template     Missed doses: No     Medication changes:  No     S/S of bleeding or thromboembolism:  No     New Injury or illness:  No     Changes in diet or alcohol consumption:  Yes: eating greens/vit k consistently per patient report.     Upcoming surgery, procedure or cardioversion:  No    Anticoagulation Episode Summary     Current INR goal:   2.0-3.0   TTR:   75.1 % (4.2 y)   Next INR check:   3/26/2019   INR from last check:   2.40 (2/26/2019)   Weekly max warfarin dose:      Target end date:      INR check location:      Preferred lab:      Send INR reminders to:   ANTICOAGULATION POOL C (DTN,VAD,CGR,GAV)    Indications    A-fib (H) (Resolved) [I48.91]           Comments:            Anticoagulation Care  Providers     Provider Role Specialty Phone number    Natasha Nunez MD Referring Family Medicine [phone]

## 2021-06-24 NOTE — TELEPHONE ENCOUNTER
Refill Approved    Rx renewed per Medication Renewal Policy. Medication was last renewed on 2.28.18-due for OV.    Rachell Hodges, [facility] Triage/Med Refill 2/12/2019     Requested Prescriptions   Pending Prescriptions Disp Refills     lisinopril (PRINIVIL,ZESTRIL) 20 MG tablet [Pharmacy Med Name: LISINOPRIL 20 MG TABLET] 180 tablet 1     Sig: TAKE ONE TABLET BY MOUTH TWICE A DAY    Ace Inhibitors Refill Protocol Passed - 2/10/2019 12:35 AM       Passed - PCP or prescribing provider visit in past 12 months      Last office visit with prescriber/PCP: 10/1/2015 Natasha Nunez MD OR same dept: Visit date not found OR same specialty: 9/12/2017 Hailey Naqvi FNP  Last physical: 2/28/2018 Last MTM visit: Visit date not found   Next visit within 3 mo: Visit date not found  Next physical within 3 mo: Visit date not found  Prescriber OR PCP: Natasha Nunez MD  Last diagnosis associated with med order: 1. Essential hypertension  - lisinopril (PRINIVIL,ZESTRIL) 20 MG tablet [Pharmacy Med Name: LISINOPRIL 20 MG TABLET]; TAKE ONE TABLET BY MOUTH TWICE A DAY  Dispense: 180 tablet; Refill: 1    2. Permanent atrial fibrillation (H)  - diltiazem (CARDIZEM CD) 120 MG 24 hr capsule [Pharmacy Med Name: DILTIAZEM 24HR  MG CAP]; Take 2 capsules (240 mg total) by mouth daily.  Dispense: 180 capsule; Refill: 2    If protocol passes may refill for 12 months if within 3 months of last provider visit (or a total of 15 months).            Passed - Serum Potassium in past 12 months    Lab Results   Component Value Date    Potassium 4.6 02/17/2018            Passed - Blood pressure filed in past 12 months    BP Readings from Last 1 Encounters:   01/08/19 106/68            Passed - Serum Creatinine in past 12 months    Creatinine   Date Value Ref Range Status   02/17/2018 1.61 (H) 0.60 - 1.10 mg/dL Final             diltiazem (CARDIZEM CD) 120 MG 24 hr capsule [Pharmacy Med Name: DILTIAZEM 24HR  MG CAP] 180 capsule 2      Sig: TAKE 2 CAPSULES (240 MG TOTAL) BY MOUTH DAILY.    Calcium-Channel Blockers Protocol Passed - 2/10/2019 12:35 AM       Passed - PCP or prescribing provider visit in past 12 months or next 3 months    Last office visit with prescriber/PCP: 10/1/2015 Natasha Nunez MD OR same dept: Visit date not found OR same specialty: 9/12/2017 Hailey Naqvi, LAZP  Last physical: 2/28/2018 Last MTM visit: Visit date not found   Next visit within 3 mo: Visit date not found  Next physical within 3 mo: Visit date not found  Prescriber OR PCP: Natasha uNnez MD  Last diagnosis associated with med order: 1. Essential hypertension  - lisinopril (PRINIVIL,ZESTRIL) 20 MG tablet [Pharmacy Med Name: LISINOPRIL 20 MG TABLET]; TAKE ONE TABLET BY MOUTH TWICE A DAY  Dispense: 180 tablet; Refill: 1    2. Permanent atrial fibrillation (H)  - diltiazem (CARDIZEM CD) 120 MG 24 hr capsule [Pharmacy Med Name: DILTIAZEM 24HR  MG CAP]; Take 2 capsules (240 mg total) by mouth daily.  Dispense: 180 capsule; Refill: 2    If protocol passes may refill for 12 months if within 3 months of last provider visit (or a total of 15 months).            Passed - Blood pressure filed in past 12 months    BP Readings from Last 1 Encounters:   01/08/19 106/68

## 2021-06-25 NOTE — PROGRESS NOTES
Progress Notes by Waqar Cobian MD at 4/20/2017 10:10 AM     Author: Waqar Cobian MD Service: -- Author Type: Physician    Filed: 4/20/2017 10:51 AM Encounter Date: 4/20/2017 Status: Signed    : Waqar Cobian MD (Physician)                 Arrhythmia Clinic    Thank you, Dr. Natasha Nunez MD, for asking the [facility] [place] [facility] Arrhythmia team to evaluate Liz Gonzalez in consultation for atrial fibrillation      Assessment:     88 year old female with permanent atrial fibrillation presents for evaluation     Plan:     Atrial fibrillation - permanent and asymptomatic.  Patient has a chads2-vasc of 5-6 and is appropriately anticoagulated with coumadin.  Her INRs have been therapeutic.  A recent holter monitor demonstrates mildly elevated heart rate response, but again, patient remains asymptomatic.    Will add low dose long acting cardizem today.  Patient is instructed to have nurse at her assisted living facility check BP for the next few days to ensure no significant hypotension with addition of medication.    Repeat holter monitor in 1 month.  If heart rate average response < 100 bpm, no further treatment is indicated.      I have indicated she needs to call Dr. Schmidt's office for instructions regarding lasix dosing.     Current History:   Liz Gonzalez is a 88 y.o. female with a history of CAD, CHF, pHTN, HTN and permanent atrial fibrillation.  She has no sensation of AF and has no symptoms associated with such.  She remains on coumadin for anticoagulation due to elevated chads2-vasc score.  A recent holter monitor was performed demonstrating average heart rate response in AF of 102 bpm, though some mild elevation during the day.  This is entirely asymptomatic.  She is now referred to AF clinic for further evaluation.  Her only questions today are regarding her renal function and lasix dosing.    Past Medical History:     Past Medical History:    Diagnosis Date   ? Atrial fibrillation    ? Cancer 2010    squamous cell CA in lower right lobe   ? CHF (congestive heart failure)    ? Coronary artery disease    ? Glaucoma    ? Hypertension    ? Hyponatremia    ? Lung cancer    ? Osteoarthritis    ? Osteoporosis    ? Prinzmetal's angina    ? Restless leg syndrome    ? Spinal stenosis        Past Surgical History:     Past Surgical History:   Procedure Laterality Date   ? BACK SURGERY     ? breast biopsies      X2   ? BREAST BIOPSY Left    ? DILATION AND CURETTAGE OF UTERUS     ? KNEE SURGERY Left 1992    removed bone chips   ? LUMBAR LAMINECTOMY Bilateral 6/20/2014    Procedure: POSTERIOR LUMBAR DECOMPRESSION LAMINECTOMY L4-5 BILATERAL;  Surgeon: Florentin Blackmon MD;  Location: [facility] [place];  Service:    ? LUNG CANCER SURGERY Right 2010    removed lower lobe   ? LUNG SURGERY  2010    squamous cell CA, right lower lobectomy   ? MS BIOPSY OF BREAST, INCISIONAL      Description: Biopsy Breast Open;  Recorded: 05/08/2008;   ? MS BIOPSY OF BREAST, INCISIONAL      Description: Biopsy Breast Open;  Recorded: 05/08/2008;   ? MS DILATION/CURETTAGE,DIAGNOSTIC      Description: Dilation And Curettage;  Recorded: 05/08/2008;   ? MS REMOVAL OF LUNG,LOBECTOMY      Description: Lung Lobectomy;  Recorded: 05/06/2010;  Comments: Lower lobectomy for Ca.       Family History:     Family History   Problem Relation Age of Onset   ? No Medical Problems Mother    ? No Medical Problems Father    ? No Medical Problems Sister    ? No Medical Problems Daughter    ? No Medical Problems Maternal Grandmother    ? No Medical Problems Maternal Grandfather    ? No Medical Problems Paternal Grandmother    ? No Medical Problems Paternal Grandfather    ? No Medical Problems Maternal Aunt    ? No Medical Problems Paternal Aunt    ? BRCA 1/2 Neg Hx    ? Breast cancer Neg Hx    ? Cancer Neg Hx    ? Colon cancer Neg Hx    ? Endometrial cancer Neg Hx    ? Ovarian cancer Neg Hx        Social  History:    reports that she quit smoking about 6 years ago. She has a 12.50 pack-year smoking history. She does not have any smokeless tobacco history on file. She reports that she drinks about 4.2 oz of alcohol per week  She reports that she does not use illicit drugs.    Meds:     Current Outpatient Prescriptions:   ?  acetaminophen (TYLENOL) 325 MG tablet, Take 650 mg by mouth 3 (three) times a day., Disp: , Rfl:   ?  ANTIOX#10/OM3/DHA/EPA/LUT/ZEAX (I-CAPS ORAL), Take 1 tablet by mouth 2 (two) times a day., Disp: , Rfl:   ?  CALCIUM CARBONATE/VITAMIN D3 (CALTRATE 600 + D ORAL), Take 1 tablet by mouth daily., Disp: , Rfl:   ?  carvedilol (COREG) 25 MG tablet, TAKE ONE TABLET BY MOUTH TWICE A DAY WITH MEALS, Disp: 180 tablet, Rfl: 2  ?  cholecalciferol, vitamin D3, (CHOLECALCIFEROL) 1,000 unit tablet, Take 1,000 Units by mouth bedtime. , Disp: , Rfl:   ?  denosumab (PROLIA) 60 mg/mL Syrg, Inject 60 mg under the skin every 6 (six) months. Outpatient Injection only.  Due around July 2014, Disp: , Rfl:   ?  FOLIC ACID/MV,FE,OTHER MIN (CENTRUM ORAL), Take 1 tablet by mouth daily., Disp: , Rfl:   ?  furosemide (LASIX) 40 MG tablet, TAKE ONE TABLET BY MOUTH TWICE A DAY AT 9AM AND 6PM (Patient taking differently: TAKE ONE TABLET BY MOUTH DAILY), Disp: 180 tablet, Rfl: 2  ?  glucosamine-chondroitin 500-400 mg tablet, Take 1 tablet by mouth 2 (two) times a day., Disp: , Rfl:   ?  latanoprost (XALATAN) 0.005 % ophthalmic solution, Administer 1 drop to both eyes bedtime., Disp: , Rfl:   ?  lisinopril (PRINIVIL,ZESTRIL) 20 MG tablet, TAKE ONE TABLET BY MOUTH TWICE A DAY, Disp: 180 tablet, Rfl: 2  ?  nitroglycerin (NITROSTAT) 0.4 MG SL tablet, Place 1 tablet (0.4 mg total) under the tongue every 5 (five) minutes as needed for chest pain (pt has never used)., Disp: 25 tablet, Rfl: 11  ?  timolol (BETIMOL) 0.5 % ophthalmic solution, Administer 1 drop into the left eye daily., Disp: , Rfl:   ?  warfarin (COUMADIN) 4 MG tablet,  "TAKE ONE TABLET BY MOUTH EVERY DAY OR AS DIRECTED, Disp: 90 tablet, Rfl: 1  ?  diltiazem (CARDIZEM CD) 120 MG 24 hr capsule, Take 1 capsule (120 mg total) by mouth daily., Disp: 30 capsule, Rfl: 3  ?  gabapentin (NEURONTIN) 100 MG capsule, Increase dose as directed by chart.  May increase to 1 tabs 3 times daily, Disp: 180 capsule, Rfl: 2  ?  warfarin (COUMADIN) 4 MG tablet, Take 4 mg by mouth daily., Disp: , Rfl:     Current Facility-Administered Medications:   ?  denosumab 60 mg (PROLIA 60 mg/ml), 60 mg, Subcutaneous, Q6 Months, Uyen Thomas MD, 60 mg at 04/29/16 1114    Allergies:   Penicillins    Review of Systems:    General: WNL  Eyes: WNL  Ears/Nose/Throat: WNL  Lungs: Shortness of Breath  Heart: Shortness of Breath with activity, Irregular Heartbeat, Leg Swelling  Stomach: WNL  Bladder: WNL  Muscle/Joints: Joint Pain, Muscle Weakness  Skin: WNL  Nervous System: Daytime Sleepiness, Loss of Balance  Mental Health: Depression, Anxiety     Blood: Easy Bleeding, Easy Bruising        Objective:      Physical Exam  Weight: Weight: 103 lb (46.7 kg)  Body mass index is 21.53 kg/(m^2).  /68 (Patient Site: Left Arm, Patient Position: Sitting, Cuff Size: Adult Small)  Pulse 100  Resp 16  Ht 4' 10\" (1.473 m)  Wt 103 lb (46.7 kg)  BMI 21.53 kg/m2    General Appearance:   Nad, alert and oriented x 3   HEENT:  Mmm, perrl   Neck: No goiter noted   Chest: No deformities noted   Lungs:   Clear bilaterally   Cardiovascular:   Irregularly irregular   Abdomen:  Soft and non-tender   Extremities: No clubbing, trace bilateral lower extremity edema   Skin: No rashes                 Cardiographics  Holter 3/17 personally reviewed    Imaging  None    Lab Review   Lab Results   Component Value Date     04/13/2017    K 5.0 04/13/2017     (H) 04/13/2017    CO2 21 (L) 04/13/2017    BUN 39 (H) 04/13/2017    CREATININE 1.46 (H) 04/13/2017    CALCIUM 9.8 04/13/2017     Lab Results   Component Value Date    WBC " 8.1 03/31/2016    WBC 6.9 06/16/2014    HGB 11.2 (L) 03/31/2016    HCT 34.3 (L) 03/31/2016    MCV 98 03/31/2016     03/31/2016     Lab Results   Component Value Date    CHOL 133 05/11/2010    TRIG 104 05/11/2010    HDL 44 05/11/2010         Waqar Cobian MD  [facility], Electrophysiology

## 2021-06-28 NOTE — PROGRESS NOTES
Progress Notes by Galdino Schmidt MD (Ted) at 3/4/2020  1:10 PM     Author: Galdino Schmidt MD (Ted) Service: -- Author Type: Physician    Filed: 3/4/2020  1:35 PM Encounter Date: 3/4/2020 Status: Signed    : Galdino Schmidt MD (Ted) (Physician)           Cardiology Progress Note    Assessment:  Permanent atrial fibrillation, satisfactory rate control, on warfarin  Chronic diastolic heart failure, stable, no fluid overload  Right heart failure and mild pulmonary hypertension secondary to chronic diastolic heart failure  History of stress cardiomyopathy, normalization of LV systolic performance  Coronary artery disease, mild, nonobstructive  COPD  Hypertension, good control  Weight loss/poor appetite      Plan:  Continue current medications with no changes    Subjective:   This is 89 y.o. female who comes in today for visit.  She reports no new cardiac symptoms.  She is unaware of irregular heart rhythm.  She has not had syncope.  Her weight has been gradually declining.  She has not had PND, orthopnea, pedal edema.  She suffers from back pain.    Review of Systems:   General: Weight Loss  Eyes: WNL  Ears/Nose/Throat: WNL  Lungs: WNL  Heart: WNL  Stomach: WNL  Bladder: WNL  Muscle/Joints: Joint Pain, Muscle Weakness  Skin: WNL  Nervous System: Loss of Balance  Mental Health: WNL     Blood: WNL    Objective:   /74 (Patient Site: Left Arm, Patient Position: Sitting, Cuff Size: Adult Small)   Pulse (!) 135   Resp 12   Wt (!) 96 lb (43.5 kg)   SpO2 100%   Breastfeeding No   BMI 20.71 kg/m    Physical Exam:  GENERAL: no distress  NECK: No JVD  LUNGS: Clear to auscultation.  CARDIAC:irregular rhythm, S1 & S2 normal.  No heaves, thrills, gallops or murmurs.  ABDOMEN: flat, negative hepatosplenomegaly, soft and non-tender.  EXTREMITIES: No evidence of cyanosis, clubbing or edema.    Current Outpatient Medications   Medication Sig Dispense Refill   ? acetaminophen  (TYLENOL) 500 MG tablet Take 1,000 mg by mouth 3 (three) times a day.     ? ANTIOX#10/OM3/DHA/EPA/LUT/ZEAX (I-CAPS ORAL) Take 1 tablet by mouth 2 (two) times a day.     ? CALCIUM CARBONATE/VITAMIN D3 (CALTRATE 600 + D ORAL) Take 1 tablet by mouth daily.     ? carvediloL (COREG) 25 MG tablet TAKE ONE TABLET BY MOUTH TWICE A DAY WITH MEALS 180 tablet 0   ? cholecalciferol, vitamin D3, (CHOLECALCIFEROL) 1,000 unit (25 mcg) tablet Take 1,000 Units by mouth at bedtime.      ? denosumab (PROLIA) 60 mg/mL Syrg Inject 60 mg under the skin every 6 (six) months. Outpatient Injection only.  Due around July 2014     ? diltiazem (CARDIZEM CD) 120 MG 24 hr capsule Take 2 capsules (240 mg total) by mouth daily. 180 capsule 3   ? FOLIC ACID/MV,FE,OTHER MIN (CENTRUM ORAL) Take 1 tablet by mouth daily.     ? furosemide (LASIX) 40 MG tablet TAKE ONE TABLET BY MOUTH TWICE A DAY AT 9 A.M. AND 6 P.M. 180 tablet 0   ? gabapentin (NEURONTIN) 100 MG capsule 1 capsule at bedtime.  If pain persists in 1-2 weeks, increase to 2 capsules at bedtime.  If pain persists, then increase to 3 capsules at bedtime. 90 capsule 3   ? glucosamine-chondroitin 500-400 mg tablet Take 1 tablet by mouth 2 (two) times a day.     ? latanoprost (XALATAN) 0.005 % ophthalmic solution Administer 1 drop to both eyes bedtime.     ? lisinopril (PRINIVIL,ZESTRIL) 20 MG tablet Take 1 tablet (20 mg total) by mouth 2 (two) times a day. 180 tablet 3   ? nitroglycerin (NITROSTAT) 0.4 MG SL tablet Place 1 tablet (0.4 mg total) under the tongue every 5 (five) minutes as needed for chest pain (pt has never used). 25 tablet 11   ? timolol maleate (TIMOPTIC) 0.5 % ophthalmic solution USE 1 DROP(S) IN LEFT EYE ONCE A DAY. 90 DAY SUPPLY  3   ? warfarin (COUMADIN/JANTOVEN) 4 MG tablet Takes 1/2 tablet (2mg) to 1 tablet (4mg) by mouth daily, as directed.  Will adjust dose based on INR results. 90 tablet 1     No current facility-administered medications for this visit.         Cardiographics:    Echocardiogram: April 2016   Normal left ventricular size.  Normal left ventricular wall thickness.  Left ventricular ejection fraction is visually estimated to be 55%.  No regional wall motion abnormalities.  Moderately enlarged right ventricle.  Right ventricular systolic function is mildly reduced.  Estimated right ventricular systolic pressure is 50 mmHg.  Severely enlarged left atrium.  Dilated IVC with reduced inspiratory collapse is consistent with elevated  right atrial pressure.  This study was compared with a previously done echocardiogram 11/13/14.  The results are similar    Holter: August 2017  Persistent atrial fibrillation with very mildly elevated daytime heart  rate response.        Lab Results:       Lab Results   Component Value Date    CHOL 133 05/11/2010     Lab Results   Component Value Date    HDL 44 05/11/2010     Lab Results   Component Value Date    LDLCALC 68 05/11/2010     Lab Results   Component Value Date    TRIG 104 05/11/2010     BNP   Date Value Ref Range Status   03/10/2017 749 (H) 0 - 167 pg/mL Final       Galdino (Mk)  MD [place]

## 2021-06-28 NOTE — PROGRESS NOTES
"Progress Notes by Raya Nieto AuD at 9/10/2019 10:00 AM     Author: Raya Nieto AuD Service: -- Author Type: Audiologist    Filed: 9/10/2019  5:19 PM Encounter Date: 9/10/2019 Status: Signed    : Raya Nieto AuD (Audiologist)       Audiology Report:    Referring Provider:  Hailey Naqvi    History:  Liz Gonzalez is seen today for comprehensive hearing evaluation. She is accompanied today by her daughter-in-law. The patient reports there are times she is unable to understand what others are saying. She lives in a [facility] and struggles to understand presenters in large groups. She reports tinnitus, which she describes as \"dinging\". She has not had her hearing tested before.     Results:   Transducer: Circumaural headphones    Reliability was good  and there was good  SRT to PTA agreement.       Plan:  Results are discussed in detail.  She should return for retesting annually, or sooner with concerns. She is a hearing aid candidate and should return for a hearing aid evaluation if interested. She reports she is not pleased to hear she would need hearing aids. She is unsure if she wishes to order them as she doesn't want others to see them.     Please see audiogram under media and audiogram in the patients chart.     Blaine Garber, CCC-A  [place] Licensed Audiologist #[id number]               "

## 2021-07-03 NOTE — ADDENDUM NOTE
Addendum Note by Sue Carrera CMA at 6/7/2018  1:10 PM     Author: Sue Carrera CMA Service: -- Author Type: Certified Medical Assistant    Filed: 6/7/2018  1:10 PM Encounter Date: 6/7/2018 Status: Signed    : Sue Carrera CMA (Certified Medical Assistant)    Addended by: SUE CARRERA on: 6/7/2018 01:10 PM        Modules accepted: Orders

## 2021-07-13 ENCOUNTER — RECORDS - HEALTHEAST (OUTPATIENT)
Dept: ADMINISTRATIVE | Facility: CLINIC | Age: 86
End: 2021-07-13

## 2021-07-14 PROBLEM — N18.4 STAGE 4 CHRONIC KIDNEY DISEASE (H): Status: RESOLVED | Noted: 2018-02-28 | Resolved: 2018-06-18

## 2021-07-21 ENCOUNTER — RECORDS - HEALTHEAST (OUTPATIENT)
Dept: ADMINISTRATIVE | Facility: CLINIC | Age: 86
End: 2021-07-21

## 2023-12-19 NOTE — TELEPHONE ENCOUNTER
Why is this INR supratherapeutic.  I would consider it  therapeutic.  Please let me know.  Jean Redd MD  12/11/2019         stable